# Patient Record
Sex: FEMALE | Race: BLACK OR AFRICAN AMERICAN | NOT HISPANIC OR LATINO | URBAN - METROPOLITAN AREA
[De-identification: names, ages, dates, MRNs, and addresses within clinical notes are randomized per-mention and may not be internally consistent; named-entity substitution may affect disease eponyms.]

---

## 2017-02-09 ENCOUNTER — OFFICE VISIT - HEALTHEAST (OUTPATIENT)
Dept: GERIATRICS | Facility: CLINIC | Age: 82
End: 2017-02-09

## 2017-02-09 DIAGNOSIS — I10 ESSENTIAL HYPERTENSION: ICD-10-CM

## 2017-02-09 DIAGNOSIS — M17.9 OA (OSTEOARTHRITIS) OF KNEE: ICD-10-CM

## 2017-02-09 DIAGNOSIS — Z86.73 HISTORY OF CVA (CEREBROVASCULAR ACCIDENT): ICD-10-CM

## 2017-02-09 DIAGNOSIS — F03.918 DEMENTIA WITH BEHAVIORAL PROBLEM (H): ICD-10-CM

## 2017-02-13 ENCOUNTER — RECORDS - HEALTHEAST (OUTPATIENT)
Dept: LAB | Facility: CLINIC | Age: 82
End: 2017-02-13

## 2017-02-13 LAB
CHOLEST SERPL-MCNC: 190 MG/DL
FASTING STATUS PATIENT QL REPORTED: YES
HDLC SERPL-MCNC: 93 MG/DL
LDLC SERPL CALC-MCNC: 90 MG/DL
TRIGL SERPL-MCNC: 34 MG/DL

## 2017-03-27 ENCOUNTER — OFFICE VISIT - HEALTHEAST (OUTPATIENT)
Dept: GERIATRICS | Facility: CLINIC | Age: 82
End: 2017-03-27

## 2017-03-27 DIAGNOSIS — F17.200 TOBACCO USE DISORDER: ICD-10-CM

## 2017-03-27 DIAGNOSIS — I10 HTN (HYPERTENSION): ICD-10-CM

## 2017-03-27 DIAGNOSIS — M17.9 OA (OSTEOARTHRITIS) OF KNEE: ICD-10-CM

## 2017-04-17 ENCOUNTER — OFFICE VISIT - HEALTHEAST (OUTPATIENT)
Dept: GERIATRICS | Facility: CLINIC | Age: 82
End: 2017-04-17

## 2017-04-17 DIAGNOSIS — R60.0 BILATERAL LEG EDEMA: ICD-10-CM

## 2017-04-17 DIAGNOSIS — M17.9 OA (OSTEOARTHRITIS) OF KNEE: ICD-10-CM

## 2017-04-17 DIAGNOSIS — I10 HTN (HYPERTENSION): ICD-10-CM

## 2017-05-03 ENCOUNTER — OFFICE VISIT - HEALTHEAST (OUTPATIENT)
Dept: GERIATRICS | Facility: CLINIC | Age: 82
End: 2017-05-03

## 2017-05-03 DIAGNOSIS — M19.90 OA (OSTEOARTHRITIS): ICD-10-CM

## 2017-05-03 DIAGNOSIS — R60.0 LEG EDEMA: ICD-10-CM

## 2017-05-03 DIAGNOSIS — F03.90 DEMENTIA (H): ICD-10-CM

## 2017-05-03 DIAGNOSIS — I10 HTN (HYPERTENSION): ICD-10-CM

## 2017-05-08 ENCOUNTER — OFFICE VISIT - HEALTHEAST (OUTPATIENT)
Dept: GERIATRICS | Facility: CLINIC | Age: 82
End: 2017-05-08

## 2017-05-08 DIAGNOSIS — M17.0 OSTEOARTHRITIS OF KNEES, BILATERAL: ICD-10-CM

## 2017-05-08 DIAGNOSIS — R60.0 LEG EDEMA: ICD-10-CM

## 2017-05-08 DIAGNOSIS — R53.1 WEAKNESS: ICD-10-CM

## 2017-05-11 ENCOUNTER — OFFICE VISIT - HEALTHEAST (OUTPATIENT)
Dept: GERIATRICS | Facility: CLINIC | Age: 82
End: 2017-05-11

## 2017-05-11 DIAGNOSIS — R53.1 WEAKNESS: ICD-10-CM

## 2017-05-11 DIAGNOSIS — R60.0 BILATERAL LEG EDEMA: ICD-10-CM

## 2017-05-11 DIAGNOSIS — R63.5 WEIGHT GAIN: ICD-10-CM

## 2017-05-11 DIAGNOSIS — I10 ESSENTIAL HYPERTENSION: ICD-10-CM

## 2017-05-15 ENCOUNTER — OFFICE VISIT - HEALTHEAST (OUTPATIENT)
Dept: GERIATRICS | Facility: CLINIC | Age: 82
End: 2017-05-15

## 2017-05-15 DIAGNOSIS — R60.0 BILATERAL EDEMA OF LOWER EXTREMITY: ICD-10-CM

## 2017-05-15 DIAGNOSIS — T50.2X5A DIURETIC-INDUCED HYPOKALEMIA: ICD-10-CM

## 2017-05-15 DIAGNOSIS — I10 ESSENTIAL HYPERTENSION: ICD-10-CM

## 2017-05-15 DIAGNOSIS — E87.6 DIURETIC-INDUCED HYPOKALEMIA: ICD-10-CM

## 2017-05-18 ENCOUNTER — OFFICE VISIT - HEALTHEAST (OUTPATIENT)
Dept: GERIATRICS | Facility: CLINIC | Age: 82
End: 2017-05-18

## 2017-05-18 DIAGNOSIS — R50.9 FEVER: ICD-10-CM

## 2017-05-18 DIAGNOSIS — G47.00 INSOMNIA: ICD-10-CM

## 2017-05-18 DIAGNOSIS — R60.0 LEG EDEMA: ICD-10-CM

## 2017-05-18 DIAGNOSIS — R53.1 WEAKNESS: ICD-10-CM

## 2017-06-05 ENCOUNTER — OFFICE VISIT - HEALTHEAST (OUTPATIENT)
Dept: GERIATRICS | Facility: CLINIC | Age: 82
End: 2017-06-05

## 2017-06-05 DIAGNOSIS — R60.0 BILATERAL EDEMA OF LOWER EXTREMITY: ICD-10-CM

## 2017-06-05 DIAGNOSIS — M17.9 OA (OSTEOARTHRITIS) OF KNEE: ICD-10-CM

## 2017-06-05 DIAGNOSIS — E87.6 HYPOKALEMIA: ICD-10-CM

## 2017-06-05 DIAGNOSIS — E83.42 HYPOMAGNESEMIA: ICD-10-CM

## 2017-06-14 ENCOUNTER — OFFICE VISIT - HEALTHEAST (OUTPATIENT)
Dept: GERIATRICS | Facility: CLINIC | Age: 82
End: 2017-06-14

## 2017-06-14 DIAGNOSIS — E83.42 HYPOMAGNESEMIA: ICD-10-CM

## 2017-06-14 DIAGNOSIS — E87.6 HYPOKALEMIA: ICD-10-CM

## 2017-06-14 DIAGNOSIS — R60.0 BILATERAL EDEMA OF LOWER EXTREMITY: ICD-10-CM

## 2017-06-14 DIAGNOSIS — K08.9 POOR DENTITION: ICD-10-CM

## 2017-06-14 DIAGNOSIS — F03.90 DEMENTIA (H): ICD-10-CM

## 2017-06-22 ENCOUNTER — OFFICE VISIT - HEALTHEAST (OUTPATIENT)
Dept: GERIATRICS | Facility: CLINIC | Age: 82
End: 2017-06-22

## 2017-06-22 DIAGNOSIS — F03.90 DEMENTIA (H): ICD-10-CM

## 2017-06-22 DIAGNOSIS — R04.0 ANTERIOR EPISTAXIS: ICD-10-CM

## 2017-06-26 ENCOUNTER — OFFICE VISIT - HEALTHEAST (OUTPATIENT)
Dept: GERIATRICS | Facility: CLINIC | Age: 82
End: 2017-06-26

## 2017-06-26 DIAGNOSIS — K08.9 POOR DENTITION: ICD-10-CM

## 2017-06-26 DIAGNOSIS — N39.0 URINARY TRACT INFECTION: ICD-10-CM

## 2017-06-26 DIAGNOSIS — R60.0 BILATERAL LEG EDEMA: ICD-10-CM

## 2017-06-26 DIAGNOSIS — R53.1 WEAKNESS: ICD-10-CM

## 2017-06-26 DIAGNOSIS — G40.909 SEIZURE DISORDER (H): ICD-10-CM

## 2017-06-26 DIAGNOSIS — F03.90 DEMENTIA (H): ICD-10-CM

## 2017-07-03 ENCOUNTER — OFFICE VISIT - HEALTHEAST (OUTPATIENT)
Dept: GERIATRICS | Facility: CLINIC | Age: 82
End: 2017-07-03

## 2017-07-03 DIAGNOSIS — F03.90 DEMENTIA (H): ICD-10-CM

## 2017-07-03 DIAGNOSIS — M15.9 OSTEOARTHRITIS, GENERALIZED: ICD-10-CM

## 2017-07-03 DIAGNOSIS — G40.909 SEIZURE DISORDER (H): ICD-10-CM

## 2017-07-03 DIAGNOSIS — R53.1 WEAKNESS: ICD-10-CM

## 2017-07-03 DIAGNOSIS — Z86.73 HISTORY OF CVA (CEREBROVASCULAR ACCIDENT): ICD-10-CM

## 2017-08-21 ENCOUNTER — OFFICE VISIT - HEALTHEAST (OUTPATIENT)
Dept: GERIATRICS | Facility: CLINIC | Age: 82
End: 2017-08-21

## 2017-08-21 DIAGNOSIS — F03.918 DEMENTIA WITH BEHAVIORAL PROBLEM (H): ICD-10-CM

## 2017-08-21 DIAGNOSIS — R50.9 HYPERPYREXIA: ICD-10-CM

## 2017-08-21 DIAGNOSIS — I10 ESSENTIAL HYPERTENSION: ICD-10-CM

## 2017-08-23 ENCOUNTER — OFFICE VISIT - HEALTHEAST (OUTPATIENT)
Dept: GERIATRICS | Facility: CLINIC | Age: 82
End: 2017-08-23

## 2017-08-23 DIAGNOSIS — R50.9 FEVER: ICD-10-CM

## 2017-08-23 DIAGNOSIS — F03.918 DEMENTIA WITH BEHAVIORAL PROBLEM (H): ICD-10-CM

## 2017-08-23 DIAGNOSIS — R53.83 LETHARGY: ICD-10-CM

## 2017-08-28 ENCOUNTER — OFFICE VISIT - HEALTHEAST (OUTPATIENT)
Dept: GERIATRICS | Facility: CLINIC | Age: 82
End: 2017-08-28

## 2017-08-28 DIAGNOSIS — J18.9 PNEUMONIA: ICD-10-CM

## 2017-08-28 DIAGNOSIS — K08.9 POOR DENTITION: ICD-10-CM

## 2017-08-28 DIAGNOSIS — F03.918 DEMENTIA WITH BEHAVIORAL PROBLEM (H): ICD-10-CM

## 2017-08-28 DIAGNOSIS — M15.9 OSTEOARTHRITIS, GENERALIZED: ICD-10-CM

## 2017-08-28 DIAGNOSIS — R53.83 LETHARGY: ICD-10-CM

## 2017-08-30 ENCOUNTER — AMBULATORY - HEALTHEAST (OUTPATIENT)
Dept: GERIATRICS | Facility: CLINIC | Age: 82
End: 2017-08-30

## 2017-10-18 ENCOUNTER — OFFICE VISIT - HEALTHEAST (OUTPATIENT)
Dept: GERIATRICS | Facility: CLINIC | Age: 82
End: 2017-10-18

## 2017-10-18 DIAGNOSIS — Z86.73 HISTORY OF CVA (CEREBROVASCULAR ACCIDENT): ICD-10-CM

## 2017-10-18 DIAGNOSIS — F03.90 DEMENTIA (H): ICD-10-CM

## 2017-10-18 DIAGNOSIS — E78.5 HYPERLIPIDEMIA: ICD-10-CM

## 2017-10-18 DIAGNOSIS — G40.909 SEIZURE DISORDER (H): ICD-10-CM

## 2017-10-18 DIAGNOSIS — I10 HYPERTENSION: ICD-10-CM

## 2017-10-25 ENCOUNTER — OFFICE VISIT - HEALTHEAST (OUTPATIENT)
Dept: GERIATRICS | Facility: CLINIC | Age: 82
End: 2017-10-25

## 2017-10-25 DIAGNOSIS — F03.90 DEMENTIA (H): ICD-10-CM

## 2017-10-25 DIAGNOSIS — M15.9 OSTEOARTHRITIS, GENERALIZED: ICD-10-CM

## 2017-10-25 DIAGNOSIS — G40.909 SEIZURE DISORDER (H): ICD-10-CM

## 2017-10-25 DIAGNOSIS — Z86.73 HISTORY OF CVA (CEREBROVASCULAR ACCIDENT): ICD-10-CM

## 2018-01-16 ENCOUNTER — OFFICE VISIT - HEALTHEAST (OUTPATIENT)
Dept: GERIATRICS | Facility: CLINIC | Age: 83
End: 2018-01-16

## 2018-01-16 DIAGNOSIS — F03.90 DEMENTIA (H): ICD-10-CM

## 2018-01-16 DIAGNOSIS — G40.909 SEIZURE DISORDER AS SEQUELA OF CEREBROVASCULAR ACCIDENT (H): ICD-10-CM

## 2018-01-16 DIAGNOSIS — I69.398 SEIZURE DISORDER AS SEQUELA OF CEREBROVASCULAR ACCIDENT (H): ICD-10-CM

## 2018-01-16 DIAGNOSIS — I61.9 CVA (CEREBROVASCULAR ACCIDENT DUE TO INTRACEREBRAL HEMORRHAGE) (H): ICD-10-CM

## 2018-01-17 ENCOUNTER — RECORDS - HEALTHEAST (OUTPATIENT)
Dept: LAB | Facility: CLINIC | Age: 83
End: 2018-01-17

## 2018-01-17 LAB
ALBUMIN SERPL-MCNC: 3 G/DL (ref 3.5–5)
ALP SERPL-CCNC: 65 U/L (ref 45–120)
ALT SERPL W P-5'-P-CCNC: 9 U/L (ref 0–45)
ANION GAP SERPL CALCULATED.3IONS-SCNC: 5 MMOL/L (ref 5–18)
AST SERPL W P-5'-P-CCNC: 14 U/L (ref 0–40)
BILIRUB SERPL-MCNC: 0.5 MG/DL (ref 0–1)
BUN SERPL-MCNC: 24 MG/DL (ref 8–28)
CALCIUM SERPL-MCNC: 9 MG/DL (ref 8.5–10.5)
CHLORIDE BLD-SCNC: 107 MMOL/L (ref 98–107)
CO2 SERPL-SCNC: 29 MMOL/L (ref 22–31)
CREAT SERPL-MCNC: 0.8 MG/DL (ref 0.6–1.1)
GFR SERPL CREATININE-BSD FRML MDRD: >60 ML/MIN/1.73M2
GLUCOSE BLD-MCNC: 81 MG/DL (ref 70–125)
LEVETIRACETAM (KEPPRA): 16 UG/ML (ref 6–46)
POTASSIUM BLD-SCNC: 4.3 MMOL/L (ref 3.5–5)
PROT SERPL-MCNC: 6 G/DL (ref 6–8)
SODIUM SERPL-SCNC: 141 MMOL/L (ref 136–145)

## 2018-03-22 ENCOUNTER — OFFICE VISIT - HEALTHEAST (OUTPATIENT)
Dept: GERIATRICS | Facility: CLINIC | Age: 83
End: 2018-03-22

## 2018-03-22 DIAGNOSIS — M19.90 OSTEOARTHRITIS: ICD-10-CM

## 2018-04-09 ENCOUNTER — OFFICE VISIT - HEALTHEAST (OUTPATIENT)
Dept: GERIATRICS | Facility: CLINIC | Age: 83
End: 2018-04-09

## 2018-04-09 DIAGNOSIS — R04.0 EPISTAXIS: ICD-10-CM

## 2018-04-12 ENCOUNTER — OFFICE VISIT - HEALTHEAST (OUTPATIENT)
Dept: GERIATRICS | Facility: CLINIC | Age: 83
End: 2018-04-12

## 2018-04-12 DIAGNOSIS — I69.398 SEIZURE DISORDER AS SEQUELA OF CEREBROVASCULAR ACCIDENT (H): ICD-10-CM

## 2018-04-12 DIAGNOSIS — G40.909 SEIZURE DISORDER AS SEQUELA OF CEREBROVASCULAR ACCIDENT (H): ICD-10-CM

## 2018-04-12 DIAGNOSIS — F03.90 DEMENTIA (H): ICD-10-CM

## 2018-04-12 DIAGNOSIS — I61.9 CVA (CEREBROVASCULAR ACCIDENT DUE TO INTRACEREBRAL HEMORRHAGE) (H): ICD-10-CM

## 2018-04-12 DIAGNOSIS — M19.90 OSTEOARTHRITIS: ICD-10-CM

## 2018-04-12 DIAGNOSIS — R04.0 EPISTAXIS: ICD-10-CM

## 2018-05-03 ENCOUNTER — OFFICE VISIT - HEALTHEAST (OUTPATIENT)
Dept: GERIATRICS | Facility: CLINIC | Age: 83
End: 2018-05-03

## 2018-05-03 DIAGNOSIS — F03.90 DEMENTIA (H): ICD-10-CM

## 2018-05-03 DIAGNOSIS — I61.9 CVA (CEREBROVASCULAR ACCIDENT DUE TO INTRACEREBRAL HEMORRHAGE) (H): ICD-10-CM

## 2018-05-03 DIAGNOSIS — G40.909 SEIZURE DISORDER AS SEQUELA OF CEREBROVASCULAR ACCIDENT (H): ICD-10-CM

## 2018-05-03 DIAGNOSIS — I69.398 SEIZURE DISORDER AS SEQUELA OF CEREBROVASCULAR ACCIDENT (H): ICD-10-CM

## 2018-05-03 DIAGNOSIS — I10 ESSENTIAL HYPERTENSION: ICD-10-CM

## 2018-05-11 ENCOUNTER — RECORDS - HEALTHEAST (OUTPATIENT)
Dept: LAB | Facility: CLINIC | Age: 83
End: 2018-05-11

## 2018-05-14 LAB — LEVETIRACETAM (KEPPRA): <2 UG/ML (ref 6–46)

## 2018-06-11 ENCOUNTER — RECORDS - HEALTHEAST (OUTPATIENT)
Dept: LAB | Facility: CLINIC | Age: 83
End: 2018-06-11

## 2018-06-11 LAB
ANION GAP SERPL CALCULATED.3IONS-SCNC: 8 MMOL/L (ref 5–18)
BASOPHILS # BLD AUTO: 0 THOU/UL (ref 0–0.2)
BASOPHILS NFR BLD AUTO: 0 % (ref 0–2)
BUN SERPL-MCNC: 25 MG/DL (ref 8–28)
CALCIUM SERPL-MCNC: 9.3 MG/DL (ref 8.5–10.5)
CHLORIDE BLD-SCNC: 106 MMOL/L (ref 98–107)
CO2 SERPL-SCNC: 25 MMOL/L (ref 22–31)
CREAT SERPL-MCNC: 0.84 MG/DL (ref 0.6–1.1)
EOSINOPHIL # BLD AUTO: 0 THOU/UL (ref 0–0.4)
EOSINOPHIL NFR BLD AUTO: 0 % (ref 0–6)
ERYTHROCYTE [DISTWIDTH] IN BLOOD BY AUTOMATED COUNT: 14.6 % (ref 11–14.5)
GFR SERPL CREATININE-BSD FRML MDRD: >60 ML/MIN/1.73M2
GLUCOSE BLD-MCNC: 83 MG/DL (ref 70–125)
HCT VFR BLD AUTO: 41.8 % (ref 35–47)
HGB BLD-MCNC: 13.1 G/DL (ref 12–16)
LYMPHOCYTES # BLD AUTO: 1.7 THOU/UL (ref 0.8–4.4)
LYMPHOCYTES NFR BLD AUTO: 18 % (ref 20–40)
MCH RBC QN AUTO: 26 PG (ref 27–34)
MCHC RBC AUTO-ENTMCNC: 31.3 G/DL (ref 32–36)
MCV RBC AUTO: 83 FL (ref 80–100)
MONOCYTES # BLD AUTO: 0.9 THOU/UL (ref 0–0.9)
MONOCYTES NFR BLD AUTO: 10 % (ref 2–10)
NEUTROPHILS # BLD AUTO: 6.7 THOU/UL (ref 2–7.7)
NEUTROPHILS NFR BLD AUTO: 72 % (ref 50–70)
PLATELET # BLD AUTO: 132 THOU/UL (ref 140–440)
PMV BLD AUTO: 11.2 FL (ref 8.5–12.5)
POTASSIUM BLD-SCNC: 4.6 MMOL/L (ref 3.5–5)
RBC # BLD AUTO: 5.04 MILL/UL (ref 3.8–5.4)
SODIUM SERPL-SCNC: 139 MMOL/L (ref 136–145)
WBC: 9.3 THOU/UL (ref 4–11)

## 2018-06-13 ENCOUNTER — AMBULATORY - HEALTHEAST (OUTPATIENT)
Dept: GERIATRICS | Facility: CLINIC | Age: 83
End: 2018-06-13

## 2018-11-06 ENCOUNTER — RECORDS - HEALTHEAST (OUTPATIENT)
Dept: LAB | Facility: CLINIC | Age: 83
End: 2018-11-06

## 2018-11-07 LAB
ALBUMIN SERPL-MCNC: 3.6 G/DL (ref 3.5–5)
ALP SERPL-CCNC: 72 U/L (ref 45–120)
ALT SERPL W P-5'-P-CCNC: 11 U/L (ref 0–45)
ANION GAP SERPL CALCULATED.3IONS-SCNC: 9 MMOL/L (ref 5–18)
AST SERPL W P-5'-P-CCNC: 15 U/L (ref 0–40)
BILIRUB SERPL-MCNC: 0.5 MG/DL (ref 0–1)
BUN SERPL-MCNC: 25 MG/DL (ref 8–28)
CALCIUM SERPL-MCNC: 9.6 MG/DL (ref 8.5–10.5)
CHLORIDE BLD-SCNC: 104 MMOL/L (ref 98–107)
CO2 SERPL-SCNC: 26 MMOL/L (ref 22–31)
CREAT SERPL-MCNC: 0.82 MG/DL (ref 0.6–1.1)
GFR SERPL CREATININE-BSD FRML MDRD: >60 ML/MIN/1.73M2
GLUCOSE BLD-MCNC: 83 MG/DL (ref 70–125)
LEVETIRACETAM (KEPPRA): 15.2 UG/ML (ref 6–46)
POTASSIUM BLD-SCNC: 4.2 MMOL/L (ref 3.5–5)
PROT SERPL-MCNC: 6.9 G/DL (ref 6–8)
SODIUM SERPL-SCNC: 139 MMOL/L (ref 136–145)

## 2019-01-15 ENCOUNTER — RECORDS - HEALTHEAST (OUTPATIENT)
Dept: LAB | Facility: CLINIC | Age: 84
End: 2019-01-15

## 2019-01-15 LAB — TSH SERPL DL<=0.005 MIU/L-ACNC: 0.29 UIU/ML (ref 0.3–5)

## 2019-01-17 ENCOUNTER — RECORDS - HEALTHEAST (OUTPATIENT)
Dept: LAB | Facility: CLINIC | Age: 84
End: 2019-01-17

## 2019-01-17 LAB
ANION GAP SERPL CALCULATED.3IONS-SCNC: 12 MMOL/L (ref 5–18)
BUN SERPL-MCNC: 19 MG/DL (ref 8–28)
CALCIUM SERPL-MCNC: 9.5 MG/DL (ref 8.5–10.5)
CHLORIDE BLD-SCNC: 101 MMOL/L (ref 98–107)
CO2 SERPL-SCNC: 24 MMOL/L (ref 22–31)
CREAT SERPL-MCNC: 0.79 MG/DL (ref 0.6–1.1)
ERYTHROCYTE [DISTWIDTH] IN BLOOD BY AUTOMATED COUNT: 14.9 % (ref 11–14.5)
GFR SERPL CREATININE-BSD FRML MDRD: >60 ML/MIN/1.73M2
GLUCOSE BLD-MCNC: 88 MG/DL (ref 70–125)
HCT VFR BLD AUTO: 39.8 % (ref 35–47)
HGB BLD-MCNC: 12.9 G/DL (ref 12–16)
MAGNESIUM SERPL-MCNC: 2 MG/DL (ref 1.8–2.6)
MCH RBC QN AUTO: 25.9 PG (ref 27–34)
MCHC RBC AUTO-ENTMCNC: 32.4 G/DL (ref 32–36)
MCV RBC AUTO: 80 FL (ref 80–100)
PLATELET # BLD AUTO: 124 THOU/UL (ref 140–440)
PMV BLD AUTO: 11 FL (ref 8.5–12.5)
POTASSIUM BLD-SCNC: 4 MMOL/L (ref 3.5–5)
RBC # BLD AUTO: 4.98 MILL/UL (ref 3.8–5.4)
SODIUM SERPL-SCNC: 137 MMOL/L (ref 136–145)
TSH SERPL DL<=0.005 MIU/L-ACNC: 0.88 UIU/ML (ref 0.3–5)
VIT B12 SERPL-MCNC: 684 PG/ML (ref 213–816)
WBC: 8.2 THOU/UL (ref 4–11)

## 2019-03-04 ENCOUNTER — RECORDS - HEALTHEAST (OUTPATIENT)
Dept: LAB | Facility: CLINIC | Age: 84
End: 2019-03-04

## 2019-03-04 LAB — MAGNESIUM SERPL-MCNC: 2 MG/DL (ref 1.8–2.6)

## 2019-07-01 ENCOUNTER — RECORDS - HEALTHEAST (OUTPATIENT)
Dept: LAB | Facility: CLINIC | Age: 84
End: 2019-07-01

## 2019-07-01 LAB
ANION GAP SERPL CALCULATED.3IONS-SCNC: 10 MMOL/L (ref 5–18)
BUN SERPL-MCNC: 29 MG/DL (ref 8–28)
CALCIUM SERPL-MCNC: 10.1 MG/DL (ref 8.5–10.5)
CHLORIDE BLD-SCNC: 103 MMOL/L (ref 98–107)
CO2 SERPL-SCNC: 28 MMOL/L (ref 22–31)
CREAT SERPL-MCNC: 0.79 MG/DL (ref 0.6–1.1)
GFR SERPL CREATININE-BSD FRML MDRD: >60 ML/MIN/1.73M2
GLUCOSE BLD-MCNC: 70 MG/DL (ref 70–125)
MAGNESIUM SERPL-MCNC: 2.2 MG/DL (ref 1.8–2.6)
POTASSIUM BLD-SCNC: 3.6 MMOL/L (ref 3.5–5)
SODIUM SERPL-SCNC: 141 MMOL/L (ref 136–145)

## 2019-08-06 ENCOUNTER — RECORDS - HEALTHEAST (OUTPATIENT)
Dept: LAB | Facility: CLINIC | Age: 84
End: 2019-08-06

## 2019-08-06 LAB — MAGNESIUM SERPL-MCNC: 1.9 MG/DL (ref 1.8–2.6)

## 2021-05-30 VITALS — WEIGHT: 134 LBS | BODY MASS INDEX: 25.15 KG/M2

## 2021-05-30 VITALS — BODY MASS INDEX: 24.48 KG/M2 | WEIGHT: 130.4 LBS

## 2021-05-30 VITALS — BODY MASS INDEX: 24.52 KG/M2 | WEIGHT: 130.6 LBS

## 2021-05-30 VITALS — BODY MASS INDEX: 23.46 KG/M2 | WEIGHT: 125 LBS

## 2021-05-30 VITALS — BODY MASS INDEX: 24.42 KG/M2 | WEIGHT: 130.1 LBS

## 2021-05-30 VITALS — WEIGHT: 138.6 LBS | BODY MASS INDEX: 26.02 KG/M2

## 2021-05-30 VITALS — WEIGHT: 128.6 LBS | BODY MASS INDEX: 24.14 KG/M2

## 2021-05-30 VITALS — BODY MASS INDEX: 22.19 KG/M2 | WEIGHT: 118.2 LBS

## 2021-05-31 VITALS — BODY MASS INDEX: 24.25 KG/M2 | WEIGHT: 129.2 LBS

## 2021-05-31 VITALS — BODY MASS INDEX: 23.3 KG/M2 | WEIGHT: 124.1 LBS

## 2021-05-31 VITALS — WEIGHT: 129 LBS | BODY MASS INDEX: 24.22 KG/M2

## 2021-05-31 VITALS — WEIGHT: 128.2 LBS | BODY MASS INDEX: 24.07 KG/M2

## 2021-05-31 VITALS — BODY MASS INDEX: 22.76 KG/M2 | WEIGHT: 121.25 LBS

## 2021-05-31 VITALS — WEIGHT: 127.2 LBS | BODY MASS INDEX: 23.88 KG/M2

## 2021-05-31 VITALS — WEIGHT: 132 LBS | BODY MASS INDEX: 24.78 KG/M2

## 2021-05-31 VITALS — WEIGHT: 131.1 LBS | BODY MASS INDEX: 24.61 KG/M2

## 2021-05-31 VITALS — BODY MASS INDEX: 24.07 KG/M2 | WEIGHT: 128.2 LBS

## 2021-06-01 VITALS — BODY MASS INDEX: 25.79 KG/M2 | WEIGHT: 137.4 LBS

## 2021-06-01 VITALS — WEIGHT: 136 LBS | BODY MASS INDEX: 25.53 KG/M2

## 2021-06-01 VITALS — BODY MASS INDEX: 25.15 KG/M2 | WEIGHT: 134 LBS

## 2021-06-08 NOTE — PROGRESS NOTES
[facility]    [facility]   [place] [facility][id number]   Code Status: DNR/DNI      CHIEF COMPLAINT/REASON FOR VISIT:  Chief Complaint   Patient presents with     Review Of Multiple Medical Conditions       HISTORY:      HPI: Halima is a 81 y.o. female who was seen  Today to  review her chronic multiple medical conditions. Staff reported that patient has been stable  And there has not been any new changes since the last visit. He has not taken  Shower since she had been here at facility. Today she has let the staff nurse to clean her room and change her clothes. Occasionally she  lets staff give  her washcloth to clean her face and arms. She also occasionally let staff do magnus care. Patient was found walking into another resident's room and saying he is stealing her belongings. Patient does have a history of delusion and tend to  hide things in her room and she also refuses to leave the room saying that someone will steal her belongings.    Today during the visit patient was found in her usual chair very pleasant and eating her lunch. Patient asked me to join with her and eat lunch together. She was in a very good mood and very appropriate behavior. She reported she has been all doing well with no complaints of chest pain palpitation, shortness of breath, lightheaded, nausea, and vomiting. He reported that occasionally he has trouble with the patient and she also mentioned about her arthritis although she denies any pain during the visit. Staff reported that patient's toenails are getting very long and has missed  appointment with Podiatry twice in the past. With last podiatry, there was a noted from staff nurse that patient has actually refused to go down for the appointment. Podiatry is due to visit next month, and we have discussed with  Staff that we should definitely make an attempt to bring her down for the appointment. I further discussed this with patient and explain to her how  important it is for her to not miss the  appointment and make sure she gets her toenails clipped so she doesn't get injury and infection from the long toe nails. I have discussed this with our nurse manager here and it seems that patient gets very anxious and scared that she refuses to get her nail clipped.    Patient had no other new concerns at this time and answered all her questions and addressed. Her SBP was in 170's yesterday and recheck today was in 150's. She denies having any stroke like symptoms, weakness, or visual disturbances. Patient was brought down for hair wash and for the first time she let staff bring her down and help her wash her hair although she was upset a bit per staff.    Past Medical History:   Diagnosis Date     Acute respiratory failure      Ascending cholangitis      AV block, 3rd degree      Choledocholithiasis     S/P ERCP     Dementia      Hemorrhagic stroke 2010    WITH SUBSEQUENT SEIZURE     HTN (hypertension)      Hyperlipidemia      Hypertensive retinopathy      Seizure disorder      Sickle cell trait      Small bowel obstruction      Tobacco use              Family History   Problem Relation Age of Onset     Cataracts Mother      Social History     Social History     Marital status:      Spouse name: N/A     Number of children: N/A     Years of education: N/A     Social History Main Topics     Smoking status: Never Smoker     Smokeless tobacco: Current User      Comment: snuff, chew     Alcohol use No     Drug use: Not on file     Sexual activity: Not on file     Other Topics Concern     Not on file     Social History Narrative     No narrative on file         Review of Systems   Negative unless noted in HPI    .  Vitals:    02/09/17 1527   BP: 175/77   Pulse: 95   Resp: 18   Temp: 98  F (36.7  C)   SpO2: 98%   Weight: 118 lb 3.2 oz (53.6 kg)       Physical Exam   Constitutional: No distress.   HENT:   Head: Normocephalic and atraumatic.   Right Ear: External ear normal.    Left Ear: External ear normal.   Mouth/Throat: Oropharynx is clear and moist.   Eyes: Conjunctivae are normal. Right eye exhibits no discharge. Left eye exhibits no discharge. No scleral icterus.   Neck: No JVD present. No tracheal deviation present. No thyromegaly present.   Cardiovascular:   Murmur heard.  Pulmonary/Chest: Effort normal and breath sounds normal.   Abdominal: Soft. Bowel sounds are normal. She exhibits no distension. There is no tenderness.   Genitourinary:   Genitourinary Comments: Denies dysuria or CVA tenderness   Musculoskeletal: She exhibits no edema or tenderness.   Up walks around in room. Moves all her extremities   Lymphadenopathy:     She has no cervical adenopathy.   Neurological: She is alert.   Oriented to place and situation   Skin: Skin is warm and dry. She is not diaphoretic.   Psychiatric: She has a normal mood and affect. Her behavior is normal.          LABS:     Lab Results   Component Value Date    PHENYTOIN 4.7 (L) 02/01/2016     Lab Results   Component Value Date    FERRITIN 13 04/22/2015     Lab Results   Component Value Date    FERRITIN 13 04/22/2015     VITAMIN D, TOTAL (25-HYDROXY)   Date Value Ref Range Status   07/24/2014 49.5 30.0 - 80.0 ng/mL Final       Lab Results   Component Value Date    ALT 21 11/16/2016    AST 20 11/16/2016    ALKPHOS 127 (H) 11/16/2016    BILITOT 0.4 11/16/2016         Results for orders placed or performed in visit on 09/02/16   Basic Metabolic Panel   Result Value Ref Range    Sodium 139 136 - 145 mmol/L    Potassium 4.6 3.5 - 5.0 mmol/L    Chloride 102 98 - 107 mmol/L    CO2 30 22 - 31 mmol/L    Anion Gap, Calculation 7 5 - 18 mmol/L    Glucose 76 70 - 125 mg/dL    Calcium 9.5 8.5 - 10.5 mg/dL    BUN 18 8 - 28 mg/dL    Creatinine 0.70 0.60 - 1.10 mg/dL    GFR MDRD Af Amer >60 >60 mL/min/1.73m2    GFR MDRD Non Af Amer >60 >60 mL/min/1.73m2         Lab Results   Component Value Date    WBC 9.4 11/16/2016    HGB 13.3 11/16/2016    HCT 40.8  11/16/2016    MCV 84 11/16/2016     (L) 11/16/2016         ASSESSMENT:      ICD-10-CM    1. Essential hypertension I10    2. Dementia with behavioral problem F03.91    3. OA (osteoarthritis) of knee M17.9    4. History of CVA (cerebrovascular accident) Z86.73        PLAN:    1. Currently not on any HTN medication. If her BP continues to rise, will consider starting her on antihypertensive med.  2. Stable and appropriate behavior.  3. Check  dilantin, serum iron, Hgb, and levetiracetam level and fasting lipid on Monday and update result.  4. Give 0.5 mg 1/2 tab of Ativan PO X 1 before appointment with Podiatry in March. Repeat another 1/2 tab PRN.       Electronically signed by: Dwayne Galan NP

## 2021-06-09 NOTE — PROGRESS NOTES
Code Status:  DNR/DNI  Visit Type: Problem Visit (OA of knee now, growth on roof of mouth, lymph node enlargement)     Facility:  [facility] [[id number]]        Facility Type:  (Long Term Care, LTC)    History of Present Illness: Halima Limon is a 81 y.o. female who I saw today due to her ongoing pain in her bilateral knees secondary to osteoarthritis.  Patient is currently on Tylenol 500 mg daily and tramadol 50 mg every morning.  Patient is also on prednisone 10 mg daily.  Staff reported that patient has been complaining of pain in her knees and tramadol is not adequate to keep her pain in control for whole day.  Staff also reported that patient does not like taking pills and requested if I can review her medication and cut down her medications.    During the visit, patient was found sitting in her usual chair pleasant and cooperative with assessment.  Patient was pointing to her both knees and said they are hurting and she just received her morning dose of tramadol.  Patient also complained about pain under right submandibular area.  On assessment patient was found to have lymph node enlargement in bilateral submandibular area with tenderness on the right side.  She does not have any open sore or wound or ulcer in her mouth however noted a firm growth on bilateral upper palate which was nontender to touch.  Patient has a history of chronic tobacco use and currently using it.  Patient has been noncompliant with her hygiene care and most probably has not been doing self oral care or let staff help her with the oral care.  I have discussed with the staff that I would recommend to check with patient's niece who is the POA and see if we can convince her to schedule an appointment with the dentist for further assessment and evaluation for her growth in her upper palate and also right sub-mandibular pain.    Review of Systems   Negative unless noted in HPI    Physical Exam   Constitutional: No distress.   Patient room smells with urine.  She has been noncompliant with the hygiene care and has never taken shower.  HENT:   Head: Normocephalic and atraumatic.   Right Ear: External ear normal.   Left Ear: External ear normal.   Mouth/Throat: Oropharynx is clear and moist. Noted bilateral upper palate- has symmetry growth but firm and nontender. No ulcer or open areas noted in oral mucosa..   Eyes: Conjunctivae are normal. Right eye exhibits no discharge. Left eye exhibits no discharge. No scleral icterus.   Neck: No JVD present. No tracheal deviation present. No thyromegaly present.  Bilateral submandibular lymph node enlargement with tenderness on the right side.  Staff denies recent symptoms of cold, flu, fever, shortness of breath, nausea, vomiting.  Cardiovascular:   Murmur heard.  Pulmonary/Chest: Effort normal and breath sounds normal.   Abdominal: Soft. Bowel sounds are normal. She exhibits no distension. There is no tenderness.   Genitourinary:   Genitourinary Comments: Denies dysuria or CVA tenderness   Musculoskeletal: She exhibits no edema or tenderness.   Up walks around in room. Moves all her extremities . Bilateral knees tender to touch . H/o OA  Lymphadenopathy:   She has no cervical adenopathy.   Neurological: She is alert.   Oriented to place and situation   Skin: Skin is warm and dry. She is not diaphoretic.   Psychiatric: She has a normal mood and affect. Her behavior is normal.      Labs:    Results for orders placed or performed in visit on 09/02/16   Basic Metabolic Panel   Result Value Ref Range    Sodium 139 136 - 145 mmol/L    Potassium 4.6 3.5 - 5.0 mmol/L    Chloride 102 98 - 107 mmol/L    CO2 30 22 - 31 mmol/L    Anion Gap, Calculation 7 5 - 18 mmol/L    Glucose 76 70 - 125 mg/dL    Calcium 9.5 8.5 - 10.5 mg/dL    BUN 18 8 - 28 mg/dL    Creatinine 0.70 0.60 - 1.10 mg/dL    GFR MDRD Af Amer >60 >60 mL/min/1.73m2    GFR MDRD Non Af Amer >60 >60 mL/min/1.73m2           Assessment:  1. OA  (osteoarthritis) of knee     2. Tobacco use disorder     3. HTN (hypertension)         Plan:   1.  DC previous tramadol.  Start tramadol 25 mg 3 times daily scheduled and 25 mg at nightly as needed hold tramadol for oversedation and update.   2.  Patient is chronic tobacco user and has bilateral submandibular lymph node enlargement with tenderness on the right side.  I will request staff to contact patient's POA to schedule an appointment with the dentist for further evaluation and assessment.  3.  Patient is currently not on any antihypertensive.  This morning her systolic blood pressure was in 150s but her blood pressure came down after she received her tramadol this morning and now her systolic blood pressures in the 130s.  Therefore we will monitor her blood pressure twice a week and see if it improves with increased tramadol dosage and will consider adding an antihypertensive medicine if she continues to run high blood pressure.        Electronically signed by: Dwayne Galan NP

## 2021-06-10 NOTE — PROGRESS NOTES
Code Status:  DNR/DNI  Visit Type: Problem Visit (Weight gain, Leg edema, Weakness)     Facility:  [facility] [[id number]]        Facility Type:  (Long Term Care, LTC)    History of Present Illness: Halima Limon is a 81 y.o. female who I was asked to see today as patient continues to gain weight, worsening of her leg edema, hypertension and weakness.  Patient usually weighs around 124-25 pounds.  Her weight went up to 131.6 pounds.  She was briefly treated with the furosemide 10 mg with no result.  She was then started on furosemide 20 mg and increase to 30 mg when her weight went up to 134 pounds.  Today her weight is 138.6 pounds and her legs are still swollen.  Staff also reported patient seemed weak and complaining of having pain in her both lower legs from the swelling.  Patient was recently sent to the [facility] ED for further evaluation when they first found her with the leg edema with some skin discoloration on the left lower leg.  Her ultrasound showed negative for any DVT at that time.  She was not started on a diuretic at the time due to her risk for falls.    Today during the visit, patient was found in her room eating her meals.  She was pleasant and cooperative with me as usual.  She did reported that her legs are sore from the swelling.  Patient does have a history of osteoarthritis in both knees.  She was on prednisone 10 mg which was reduced to 5 mg and I discontinued it to prevent further sodium retention, hypertension and swelling.  Patient also is on as needed naproxen.  I talked to the staff today and they stated that they have been using the as needed naproxen whenever patient is having discomfort in her knees from the arthritis.  Patient has a compression stocking on.  She denies having any chest pain, heart palpitation, shortness of breath, lightheaded or dizziness.    Review of Systems   Negative unless noted in HPI    Physical Exam  HENT: Head: Normocephalic and atraumatic.  Right Ear: External ear normal. Left Ear: External ear normal.   Mouth/Throat: Oropharynx is clear and moist.   Eyes: Right eye exhibits no discharge. Left eye exhibits no discharge. No scleral icterus.   Neck: No JVD present. No tracheal deviation present. No thyromegaly present.   Cardiovascular: Normal rate and regular rhythm. Exam reveals no gallop and no friction rub.   No murmur heard.  Pulmonary/Chest: Effort normal and breath sounds normal. No respiratory distress. She has no wheezes. She has no rales. She exhibits no tenderness.   Abdominal: Soft. Bowel sounds are normal. She exhibits no distension. There is no tenderness.   Genitourinary:   Genitourinary Comments: Incontinent at times. Inconsistent with wearing brief.   Musculoskeletal: She exhibits edema. Knee pain from OA and lower leg sore from swelling. Compression stocking on. 2+ leg pitting edema in bilateral LE. Staff noted patient is more weak now.  Lymphadenopathy:   She has no cervical adenopathy.   Neurological: She is alert. Oriented to self    Skin: Skin is warm and dry. She is not diaphoretic.   Psychiatric: She has a normal mood and affect. Her behavior is normal.      Labs:   Patient's sodium level 143, potassium 3.8, BUN 20, creatinine 0.71 on May 8.    Results for orders placed or performed during the hospital encounter of 04/14/17   Basic Metabolic Panel   Result Value Ref Range    Sodium 140 136 - 145 mmol/L    Potassium 4.5 3.5 - 5.0 mmol/L    Chloride 104 98 - 107 mmol/L    CO2 28 22 - 31 mmol/L    Anion Gap, Calculation 8 5 - 18 mmol/L    Glucose 103 70 - 125 mg/dL    Calcium 9.5 8.5 - 10.5 mg/dL    BUN 20 8 - 28 mg/dL    Creatinine 0.80 0.60 - 1.10 mg/dL    GFR MDRD Af Amer >60 >60 mL/min/1.73m2    GFR MDRD Non Af Amer >60 >60 mL/min/1.73m2           Assessment:  1. Bilateral leg edema     2. Weight gain     3. Essential hypertension     4. Weakness         Plan:   1.  Start Zaroxolyn 2.5 mg twice daily today and 2.5 mg daily  starting tomorrow.    2.  Check weight every other day and monitor legs for swelling.  3.  Check BMP on Monday.  Although her potassium level was 3.8 on May 8, we will continue the potassium chloride 10 mEq supplement to prevent hypokalemia.  4.  Patient's blood pressure today is 137/71 with heart rate of 80s.  5.  Her LFT and magnesium level was drawn today and the result is pending. D/c Naproxen PRN          Electronically signed by: Dwayne Galan NP

## 2021-06-10 NOTE — PROGRESS NOTES
Code Status:  DNR/DNI  Visit Type: Problem Visit (leg edema, OA, HTN)     Facility:  [facility] [[id number]]        Facility Type:  (Long Term Care, LTC)    History of Present Illness: Halima Limon is a 81 y.o. female who I saw today for her recent increased swelling in her legs and discoloration, also following up on her chronic osteoarthritis and hypertension.  Staff noted swelling in her both legs last Friday with some discoloration on her left lower leg.  Therefore patient was sent into ED for further evaluation to check for DVT.  The ED note indicates that patient'S bilateral ultrasound of lower legs were negative for DVT and all her lab work were unremarkable with no signs of infection.  Therefore, she was sent back here to long-term care with recommendation to apply SANDRO stockings for her swelling.    Patient has a history of osteoarthritis in her both knees and she is currently on tramadol 25 mg 3 times daily and as needed naproxen.  She is also on prednisone 10 mg p.o. daily for maintenance.  Patient is also on omeprazole 20 mg twice daily I believe it is for her GI protection.  However she is on a very low dose of prednisone and has been tolerating well therefore I am going to decrease her omeprazole dose to 20 mg p.o. daily.  Staff reported no new concerns with increased pain in her knee.  During the assessment, there is no increased swelling in her knees noted.  She also has a history of hypertension and currently not on antihypertensive medicines.  Her blood pressure today was 159/78.  Her systolic blood pressure runs mostly in 140s and occasionally in 120s and 130s and high systolic 150s-170s.  Patient is allergic to lisinopril and because of her swelling and legs the calcium channel blocker may not be a good choice for her blood pressure control.  She has pitting edema in both legs 2-3+ worse on the left lower leg.  We are going to order the SANDRO stockings for her leg edema.  In addition, I  am going to put her on a very low dose of furosemide to help leg edema and also help with the blood pressure.    Review of Systems   Negative unless noted in HPI    Physical Exam  Constitutional: No distress.  Patient room smells with urine. She has been noncompliant with the hygiene care and has never taken shower.  HENT:   Head: Normocephalic and atraumatic.   Right Ear: External ear normal.   Left Ear: External ear normal.   Mouth/Throat: Oropharynx is clear and moist. Noted bilateral upper palate- has symmetry growth but firm and nontender-unchanged from previous assessment. No ulcer or open areas noted in oral mucosa. Has Dental appointment today but got cancelled due to family not able to bring her in.  Eyes: Conjunctivae are normal. Right eye exhibits no discharge. Left eye exhibits no discharge. No scleral icterus.   Neck: No JVD present. No tracheal deviation present. No thyromegaly present.  Bilateral submandibular lymph node enlargement with no tenderness today.  Staff denies recent symptoms of cold, flu, fever, shortness of breath, nausea, vomiting.  Cardiovascular:   Murmur heard. Pedal pulse palpable - but weak. Warm to touch  Pulmonary/Chest: Effort normal and breath sounds normal.   Abdominal: Soft. Bowel sounds are normal. She exhibits no distension. There is no tenderness.   Genitourinary:   Genitourinary Comments: Denies dysuria or CVA tenderness   Musculoskeletal: She exhibits no edema or tenderness.   Up walks around in room. Moves all her extremities . Bilateral knees tender to touch . H/o OA. Leg pitting edema 2-3 +  Left >right.  Lymphadenopathy:   She has no cervical adenopathy.   Neurological: She is alert.   Oriented to place and situation   Skin: Skin is warm and dry. She is not diaphoretic. Noted a discoloration spot on left mid lower leg (medial part) no redness or inflammation on surrounding tissue, non tender- appears as cold scar.   Psychiatric: She has a normal mood and affect. Her  behavior is normal.     Labs:   Recent Results (from the past 240 hour(s))   Basic Metabolic Panel    Collection Time: 04/14/17  6:09 PM   Result Value Ref Range    Sodium 140 136 - 145 mmol/L    Potassium 4.5 3.5 - 5.0 mmol/L    Chloride 104 98 - 107 mmol/L    CO2 28 22 - 31 mmol/L    Anion Gap, Calculation 8 5 - 18 mmol/L    Glucose 103 70 - 125 mg/dL    Calcium 9.5 8.5 - 10.5 mg/dL    BUN 20 8 - 28 mg/dL    Creatinine 0.80 0.60 - 1.10 mg/dL    GFR MDRD Af Amer >60 >60 mL/min/1.73m2    GFR MDRD Non Af Amer >60 >60 mL/min/1.73m2   BNP(B-type Natriuretic Peptide)    Collection Time: 04/14/17  6:09 PM   Result Value Ref Range    BNP 24 0 - 159 pg/mL       Assessment:  1. Bilateral leg edema      Left worse than right   2. HTN (hypertension)     3. OA (osteoarthritis) of knee         Plan:   1.  Apply compression stockings in a.m. and remove in p.m.  Weekly weight per facility protocol and update if weight greater than 3 pounds from the previous reading.  2.  Start furosemide 10 mg p.o. daily ×7 days.  Check BMP in 1 week.   3.  Check blood pressure and heart rate daily and update in a week.   4.  Hold furosemide if systolic blood pressure less than 105.  Update if worsening of leg edema.  5.  Decrease omeprazole to 20 mg p.o. daily.      35  minutes spent of which greater than 65 % was face to face communication with the patient, coordination with staff, and reviewing patient's past medical record about above plan of care    Electronically signed by: Dwayne Galan NP

## 2021-06-10 NOTE — PROGRESS NOTES
Code Status:  DNR/DNI  Visit Type: Problem Visit (leg edema, HTN, Hypokalemia)     Facility:  [facility] [[id number]]        Facility Type:  (Long Term Care, LTC)    History of Present Illness: Halima Limon is a 81 y.o. female who I was asked to see today due to her leg edema.  Staff reported that patient legs are weeping but does not have any open sore.  Patient also has a history of hypertension and today found to have hypokalemia due to diuretic.    Patient's weight was 135 pounds on May 12 and today her weight is 130.1 pounds.  Patient usually weighs around 124-125 pounds.  During my visit with the patient she was found sitting in her wheelchair.  Since she has been having issue with the increased leg edema, she has not been able to walk.  Staff also reported that patient is complaining of having pain and discomfort in her both legs.  At one point her weight went up to 138 pounds.  She was on furosemide 20 mg which was increased to 30 mg with no result.  Since she was started on metolazone 2.5 on May 12, she has lost at least 8 pounds.  Staff has been working hard to get her blood pressure checked every day.  However, most of the time she refuses.  Her blood pressure fluctuates from systolic 160s down to 118.  Heart rate has been stable.    Patient denies any pain in her legs during my visit.  She appears comfortable but noted her somewhat low energy compared to past.  She further denies having any chest pain, shortness of breath, lightheaded or dizziness.  Her weight loss reassures me that the metolazone has somewhat helped in fluid retention in her legs.  Her legs are still swollen and was weeping per staff which is probably due to the metolazone.  Patient is currently on potassium supplement 10 mEq p.o. daily.  Her potassium today is 3.4.  Her magnesium was 1.7.    Review of Systems   Negative unless noted in HPI     Physical Exam   Constitutional: No distress.   Appears tired   HENT:   Head:  Normocephalic and atraumatic.   Right Ear: External ear normal.   Left Ear: External ear normal.   Mouth/Throat: Oropharynx is clear and moist.   Eyes: Conjunctivae are normal. Right eye exhibits no discharge. Left eye exhibits no discharge. No scleral icterus.   Neck: No JVD present. No tracheal deviation present. No thyromegaly present.   Cardiovascular: Normal rate and regular rhythm.    Pulmonary/Chest: Effort normal and breath sounds normal. No respiratory distress. She has no wheezes.   Abdominal: Soft. Bowel sounds are normal. She exhibits no distension. There is no tenderness.   Genitourinary:   Genitourinary Comments: Denies dysuria   Musculoskeletal:   Edema 2+ in both legs- slight improvement compared to my previous assessment. Lost 8 lbs since being on metolazone. Compression stocking on.  Denies knee pain. Walking less since leg swelling started. More wheelchair bound   Lymphadenopathy:     She has no cervical adenopathy.   Neurological: She is alert.   Oriented to self   Skin: Skin is warm and dry. She is not diaphoretic.   Psychiatric: She has a normal mood and affect. Her behavior is normal.   Pleasant and cooperative.     Labs:    Recent Results (from the past 240 hour(s))   Basic Metabolic Panel    Collection Time: 05/08/17 12:22 PM   Result Value Ref Range    Sodium 143 136 - 145 mmol/L    Potassium 3.8 3.5 - 5.0 mmol/L    Chloride 104 98 - 107 mmol/L    CO2 27 22 - 31 mmol/L    Anion Gap, Calculation 12 5 - 18 mmol/L    Glucose 105 70 - 125 mg/dL    Calcium 9.3 8.5 - 10.5 mg/dL    BUN 20 8 - 28 mg/dL    Creatinine 0.71 0.60 - 1.10 mg/dL    GFR MDRD Af Amer >60 >60 mL/min/1.73m2    GFR MDRD Non Af Amer >60 >60 mL/min/1.73m2   Magnesium    Collection Time: 05/12/17  2:10 PM   Result Value Ref Range    Magnesium 1.7 (L) 1.8 - 2.6 mg/dL   Potassium    Collection Time: 05/12/17  2:10 PM   Result Value Ref Range    Potassium 4.0 3.5 - 5.0 mmol/L   Hepatic Profile    Collection Time: 05/12/17  2:10 PM    Result Value Ref Range    Bilirubin, Total 0.2 0.0 - 1.0 mg/dL    Bilirubin, Direct 0.1 <=0.5 mg/dL    Protein, Total 7.3 6.0 - 8.0 g/dL    Albumin 3.3 (L) 3.5 - 5.0 g/dL    Alkaline Phosphatase 126 (H) 45 - 120 U/L    AST 22 0 - 40 U/L    ALT 22 0 - 45 U/L   Basic Metabolic Panel    Collection Time: 05/15/17  7:34 AM   Result Value Ref Range    Sodium 139 136 - 145 mmol/L    Potassium 3.4 (L) 3.5 - 5.0 mmol/L    Chloride 101 98 - 107 mmol/L    CO2 27 22 - 31 mmol/L    Anion Gap, Calculation 11 5 - 18 mmol/L    Glucose 97 70 - 125 mg/dL    Calcium 9.3 8.5 - 10.5 mg/dL    BUN 24 8 - 28 mg/dL    Creatinine 0.76 0.60 - 1.10 mg/dL    GFR MDRD Af Amer >60 >60 mL/min/1.73m2    GFR MDRD Non Af Amer >60 >60 mL/min/1.73m2       Assessment:  1. Bilateral edema of lower extremity     2. Diuretic-induced hypokalemia     3. Essential hypertension         Plan:  1. Continue Metolazone 2.5 mg PO daily.  Once patient's weight is close to her baseline, I will consider weaning her off of metolazone.  2.  Increase potassium chloride to 20 mEq p.o. daily.  Check potassium and magnesium level on Thursday.  3.  Today blood pressure is stable.  We will continue to monitor her blood pressure and will consider adding a blood pressure medicine once we wean her off of metolazone if her blood pressure continues to run high.  4.  Reduce Seroquel from 75 mg to 50 mg at bedtime since patient seemed more tired during the daytime.  Patient is also getting scheduled tramadol 3 times daily for her knee pain.        Electronically signed by: Dwayne Galan NP

## 2021-06-10 NOTE — PROGRESS NOTES
Code Status:  DNR/DNI  Visit Type: Problem Visit (Insomnia, Weakness, Leg Edema, Fever)     Facility:  [facility] [[id number]]        Facility Type:  (Long Term Care, LTC)    History of Present Illness: Halima Limon is a 81 y.o. female who I was asked to see today as patient did not sleep whole night last night and stayed up in the chair.  She was also found to be very weak today.  Patient has been having an ongoing issue with her leg edema and was started on furosemide which did not help with the swelling and therefore was switched to metolazone 2.5 mg p.o. daily.  Her last BMP report on May 15 was unremarkable except potassium 3.4 and therefore was started on the potassium supplement.  And her magnesium level at the time was 1.7.  Patient was also noted to have a low-grade fever of 99.4 which is new.    Staff has been reporting that patient has been more sleepy and weak during the daytime.  She was on Seroquel 75 mg p.o. at bedtime which I reduced the dose to 50 mg at bedtime on May 16.  We have plan to check her magnesium and potassium level today.  However, the lab was not successful in drawing blood this morning.  During the visit, patient was found laying in her bed appearing week.  Since last October, I have never seen patient laying in bed during the day time.  She is usually mostly up sitting in her chair.  She also has been mostly wheelchair-bound due to the weakness, swelling in her legs and pain in her legs from the arthritis.      We also started a compression stocking to help with the swelling however patient refused to wear the stockings most of the time.  There is somewhat improvement in her leg swelling and her weight dropped from 138 pounds down to 130.4 pounds since was started on metolazone.  Patient's baseline weight is around 125-128 pounds.  She has been denying having any chest pain, heart palpitation, shortness of breath, lightheaded or dizziness.      Her chest x-ray from the  "hospital on April 14 showed mild cardiomegaly with no murmur pulmonary vessels, mild tortuosity of thoracic aorta, and both hemidiaphragms are elevated with clear lungs.  Her ultrasound of bilateral lower extremity showed negative for DVT.  Her LFT on May 12 is unremarkable except albumin 3.3, alk phos weight 126.  Her BNP on April 14 was 24.     As I was talking with the staff nurse and the nurse manager, we have noted that overall there is a decline in patient's condition.  She is mostly noncompliant with the treatment plan.  She refuses to get cleaned or change her clothes and rarely let staff help her with the hygiene care.    Review of Systems   Negative unless noted in HPI    Physical Exam   Constitutional: No distress. Appears weak and said \"I am not feeling good\". More quite today.  HENT:   Head: Normocephalic and atraumatic.   Right Ear: External ear normal.   Left Ear: External ear normal.   Mouth/Throat: Oropharynx is clear and moist.   Eyes: Conjunctivae are normal. Right eye exhibits no discharge. Left eye exhibits no discharge. No scleral icterus.   Neck: No JVD present. No tracheal deviation present. No thyromegaly present.   Cardiovascular: Normal rate and regular rhythm.    Pulmonary/Chest: Effort normal and breath sounds normal. No respiratory distress. She has no wheezes.   Abdominal: Soft. Bowel sounds are normal. She exhibits no distension. There is no tenderness.   Genitourinary:   Genitourinary Comments: Denies dysuria - incontinent  Musculoskeletal: Edema 2+ in both legs- slight improvement compared to my previous assessment. Lost 8 lbs since being on metolazone. Compression stocking on. More wheelchair bound since the leg swelling got worse. Found in bed today and staff reported patient was up all night.  Lymphadenopathy:     She has no cervical adenopathy.   Neurological: She is alert. Oriented to self   Skin: Skin is warm and dry. She is not diaphoretic.   Psychiatric: She has a normal " mood and affect. Her behavior is normal. More quite today and stated not feeling good.     Labs:    Recent Results (from the past 240 hour(s))   Magnesium    Collection Time: 05/12/17  2:10 PM   Result Value Ref Range    Magnesium 1.7 (L) 1.8 - 2.6 mg/dL   Potassium    Collection Time: 05/12/17  2:10 PM   Result Value Ref Range    Potassium 4.0 3.5 - 5.0 mmol/L   Hepatic Profile    Collection Time: 05/12/17  2:10 PM   Result Value Ref Range    Bilirubin, Total 0.2 0.0 - 1.0 mg/dL    Bilirubin, Direct 0.1 <=0.5 mg/dL    Protein, Total 7.3 6.0 - 8.0 g/dL    Albumin 3.3 (L) 3.5 - 5.0 g/dL    Alkaline Phosphatase 126 (H) 45 - 120 U/L    AST 22 0 - 40 U/L    ALT 22 0 - 45 U/L   Basic Metabolic Panel    Collection Time: 05/15/17  7:34 AM   Result Value Ref Range    Sodium 139 136 - 145 mmol/L    Potassium 3.4 (L) 3.5 - 5.0 mmol/L    Chloride 101 98 - 107 mmol/L    CO2 27 22 - 31 mmol/L    Anion Gap, Calculation 11 5 - 18 mmol/L    Glucose 97 70 - 125 mg/dL    Calcium 9.3 8.5 - 10.5 mg/dL    BUN 24 8 - 28 mg/dL    Creatinine 0.76 0.60 - 1.10 mg/dL    GFR MDRD Af Amer >60 >60 mL/min/1.73m2    GFR MDRD Non Af Amer >60 >60 mL/min/1.73m2       Assessment:  1. Fever     2. Weakness     3. Insomnia     4. Leg edema         Plan:  1. Stat BMP, CBC with differential, Mg +, UA and UC if indicated.  Give Ativan 0.25 p.o. ×1/2 an hour before straight cath for obtaining urine sample.  2. Change Seroquel from 50 mg to 75 mg p.o. at at bedtime.  3. DC metolazone.  Monitor weight and leg swelling.  Update if weight gain greater than 3 pounds from the previous weight with increased swelling leg.  4. We will correct the electrolytes and infection source based on above result. Will consider palliative care consult, if patient continues to decline. This was discussed with nurse manager, and staff nurse.          Electronically signed by: Dwayne Galan NP

## 2021-06-10 NOTE — PROGRESS NOTES
Code Status:  DNR/DNI  Visit Type: Problem Visit (lower leg edema, HTN, Dementia, refuse treatment)     Facility:  [facility] [[id number]]        Facility Type:  (Long Term Care, LTC)    History of Present Illness: Halima Limon is a 81 y.o. female who I was asked to see today by staff regarding concern with patient ongoing having issue with leg edema.  Patient also has history of hypertension and not on an antihypertensive medication.  she also has a dementia and osteoarthritis in her knees.  I saw patient on April 17 for her leg edema and started her on furosemide 10 mg p.o. daily ×1 week.  I also requested staff to monitor her blood pressure and heart rate for 1 week and check her BMP in 1 week.  I remember reviewing her BMP level which was unremarkable.  However I did not get update on whether the Lasix was effective for her leg edema and blood pressure readings.    Today during the visit, I saw patient sitting in her usual chair appearing comfortable and in no distress.  Patient stated that she is doing okay.  Her both legs appear more swollen than when I saw her last time on April 17.  Patient does not have a compression stocking on.  When I checked with the staff nurse he stated that patient has been very inconsistent with wearing the compression stocking.  She sometimes allow staff to apply it on and other times she refuses.  Same issue with her weight check it appears that she has been refusing weight check most of the time although today her weight is 130.6 pounds her weight on April 14 was 128.6 pounds which indicates that she has gained at least 2 pounds.  Prior to my previous visit, she was sent to the [facility] ED when her leg edema as it was first noted with some discoloration on her left leg.  She underwent ultrasound for both legs which were negative for DVT.  However, they thought it was unsafe to put her on diuretics because of her cognitive impairment and increased risk for  fall.    Patient has been on prednisone 10 mg p.o. daily for at least more than 3 months.  As 1 of the side effect of prednisone it could possibly increase her blood pressure and fluid retention and sodium retention.  Patient is also on tramadol for her arthritis pain.  Staff reported no issues or concerns with pain control.  Therefore I discussed with the staff that I am going to decrease her prednisone to half a dose and probably discontinue and see if that helps to resolve her edema and improve her blood pressure.  I further discussed with the staff that I am going to restart her on her furosemide on a higher dose but it is very important that we monitor her weight blood pressure very closely and monitor her for dehydration and risk for fall.  Per nurse manager, patient was seen by the wound wound specialist Dr. Mayes yesterday.  Patient denies having any chest pain, heart palpitation, shortness of breath, lightheaded or dizziness.  She further denies having nausea, vomiting, diarrhea or constipation.    Review of Systems   Negative unless noted in HPI.  Staff reported no new onset of shortness of breath, chest pain, heart palpitation, shortness of breath, lightheaded or dizziness.  Physical Exam   Constitutional: No distress.   Room smells urine. Patient rarely let staff help her with bathing and changing clothes   HENT:   Head: Normocephalic and atraumatic.   Right Ear: External ear normal.   Left Ear: External ear normal.   Mouth/Throat: Oropharynx is clear and moist.   Eyes: Right eye exhibits no discharge. Left eye exhibits no discharge. No scleral icterus.   Neck: No JVD present. No tracheal deviation present. No thyromegaly present.   Cardiovascular: Normal rate and regular rhythm.  Exam reveals no gallop and no friction rub.    No murmur heard.  Pulmonary/Chest: Effort normal and breath sounds normal. No respiratory distress. She has no wheezes. She has no rales. She exhibits no tenderness.    Abdominal: Soft. Bowel sounds are normal. She exhibits no distension. There is no tenderness.   Genitourinary:   Genitourinary Comments: Incontinent at times. Inconsistent with wearing brief. Helps herself to the bathroom.   Musculoskeletal: She exhibits edema.   2+ leg edema in bilateral LE. Noted blister X 1 on each leg. Up independent.    Lymphadenopathy:     She has no cervical adenopathy.   Neurological: She is alert.   Oriented to self    Skin: Skin is warm and dry. She is not diaphoretic.   Psychiatric: She has a normal mood and affect. Her behavior is normal.       Labs:    Results for orders placed or performed during the hospital encounter of 04/14/17   Basic Metabolic Panel   Result Value Ref Range    Sodium 140 136 - 145 mmol/L    Potassium 4.5 3.5 - 5.0 mmol/L    Chloride 104 98 - 107 mmol/L    CO2 28 22 - 31 mmol/L    Anion Gap, Calculation 8 5 - 18 mmol/L    Glucose 103 70 - 125 mg/dL    Calcium 9.5 8.5 - 10.5 mg/dL    BUN 20 8 - 28 mg/dL    Creatinine 0.80 0.60 - 1.10 mg/dL    GFR MDRD Af Amer >60 >60 mL/min/1.73m2    GFR MDRD Non Af Amer >60 >60 mL/min/1.73m2         Assessment:  1. Leg edema     2. HTN (hypertension)     3. Dementia     4. OA (osteoarthritis)      Of knees       Plan:   1.  Start furosemide 20 mg p.o. daily.  Check potassium level on Monday.  Encourage patient to wear compression stocking every day.  Hold Lasix if systolic blood pressure less than 105.  2.  Monitor blood pressure and heart rate daily and update result on Monday.  Monitor weight twice weekly and update result on Monday.  3.  Safety precautions for fall as patient is on diuretics now.  This was discussed with the staff nurse.  4.  Decrease prednisone to 5 mg p.o. daily ×1 week and discontinue.          Electronically signed by: Dwayne Galan NP

## 2021-06-10 NOTE — PROGRESS NOTES
Code Status:  DNR/DNI  Visit Type: Problem Visit (Leg edema, HTN, weakness, knee pain)     Facility:  [facility] [[id number]]        Facility Type:  (Long Term Care, LTC)    History of Present Illness: Halima Limon is a 81 y.o. female who I was asked to see today to follow-up on her leg edema, hypertension, weakness, and knee pain.  Patient was recently started on furosemide 20 mg daily on May 4.  Her systolic blood pressure has been in 130s-150s with one blood pressure reading up in 160s.  Patient was noted to have increased weakness and  increased swelling in her legs.  I ordered a stat BMP level today.  Her BMP level was unremarkable.  Staff was requested to check patient's weight twice a week.  However, patient has been refusing to get her weight checked.  Her weight on May 2 was 130.6 pounds and today her weight is 134 pounds.  This indicates that patient has gained at least 4 pounds.  As per the medical flowsheet, patient has been taking her furosemide.  I also reduced her prednisone from 10 mg to 5 mg daily on May 4 for 1 week and then discontinue it with hope that it will help to improve her swelling and her blood pressure.  Her weakness could be related to increased edema in her legs and also related to the dose reduction on prednisone since patient has been on the prednisone 10 mg for a long time.    Today during the visit, patient was found in her usual chair and she was complaining of pain in her bilateral knee from the osteoarthritis.  This morning I requested staff to hold her tramadol since patient was found to be weak.  During my visit, she was comfortable but she was not drowsy or sleepy.  Therefore I requested staff to resume her as needed tramadol and monitor for oversedation.    I discussed with the staff that I will increase her furosemide dose to 30 mg daily to improve her leg swelling and also help with the hypertension.  I also will add a small dose of potassium supplement to  prevent hypokalemia related to the diuretics.     Review of Systems   Negative unless noted in HPI    Physical Exam  Constitutional: No distress. Room smells urine. Patient rarely let staff help her with bathing and changing clothes   HENT:   Head: Normocephalic and atraumatic.   Right Ear: External ear normal.   Left Ear: External ear normal.   Mouth/Throat: Oropharynx is clear and moist.   Eyes: Right eye exhibits no discharge. Left eye exhibits no discharge. No scleral icterus.   Neck: No JVD present. No tracheal deviation present. No thyromegaly present.   Cardiovascular: Normal rate and regular rhythm. Exam reveals no gallop and no friction rub.   No murmur heard.  Pulmonary/Chest: Effort normal and breath sounds normal. No respiratory distress. She has no wheezes. She has no rales. She exhibits no tenderness.   Abdominal: Soft. Bowel sounds are normal. She exhibits no distension. There is no tenderness.   Genitourinary:   Genitourinary Comments: Incontinent at times. Inconsistent with wearing brief. Helps herself to the bathroom.   Musculoskeletal: She exhibits edema. C/o pain in her both knees since her Tramadol was held this morning.  2+ leg pitting edema in bilateral LE. Up independent.    Lymphadenopathy:   She has no cervical adenopathy.   Neurological: She is alert. Oriented to self    Skin: Skin is warm and dry. She is not diaphoretic.   Psychiatric: She has a normal mood and affect. Her behavior is normal.     Labs:    May 8: Sodium 143, potassium 3.8, BUN 20, creatinine 0.71.    Results for orders placed or performed during the hospital encounter of 04/14/17   Basic Metabolic Panel   Result Value Ref Range    Sodium 140 136 - 145 mmol/L    Potassium 4.5 3.5 - 5.0 mmol/L    Chloride 104 98 - 107 mmol/L    CO2 28 22 - 31 mmol/L    Anion Gap, Calculation 8 5 - 18 mmol/L    Glucose 103 70 - 125 mg/dL    Calcium 9.5 8.5 - 10.5 mg/dL    BUN 20 8 - 28 mg/dL    Creatinine 0.80 0.60 - 1.10 mg/dL    GFR MDRD Af  Amer >60 >60 mL/min/1.73m2    GFR MDRD Non Af Amer >60 >60 mL/min/1.73m2         Assessment:  1. Leg edema     2. Weakness     3. Osteoarthritis of knees, bilateral         Plan:   1.  Increase Lasix to 30 mg p.o. daily starting tomorrow.  Add potassium chloride 10 mEq p.o. daily starting tomorrow.  Encourage patient to wear compression stocking. Elevate leg above heart level in bed and chair.  2. Check BMP, magnesium and LFT on Thursday and update result.  3.  Resume previous as needed tramadol and update if no improvement.      Electronically signed by: Dwayne Galan NP

## 2021-06-11 NOTE — PROGRESS NOTES
Code Status:  DNR/DNI  Visit Type: Problem Visit (hypomagnesemia, Hypokalemia, Leg edema, Knee pain)     Facility:  [facility] [[id number]]        Facility Type:  (Long Term Care, LTC)    History of Present Illness: Halima Limon is a 82 y.o. female who I am seeing today to follow-up on her recent low magnesium level of 1.5 and potassium level of 3.0.  Patient also has edema in her both legs and has a history of knee pain related to osteoarthritis.  Patient's magnesium level was 1.5 on May 18 and potassium level was 3.0.  We treated it with the potassium supplement and magnesium supplement.  Her recheck magnesium level was 1.5 and potassium was 3.5 on May 22.  Currently patient is on magnesium oxide 400 mg 3 times daily and she is no longer on potassium supplement.  Patient was on metolazone 2.5 mg p.o. daily for her leg edema.  However, patient became very weak and therefore we discontinued the metolazone.  Patient has a history of osteoarthritis in both knees and she is currently on tramadol 25 mg schedule 3 times daily.  She is also on scheduled Tylenol once a day.  Staff reported that sometimes patient refuses to take her medications.  For the last 4 days, patient has been taking her magnesium supplement except she missed 1 dose.  She also refuses to get her blood pressure and weight checked on a regular basis as we order.    Today during the visit, patient was found sitting in her room appearing comfortable.  She reports that her knee hurts sometimes but patient was not having pain during the visit.  Patient used to be on prednisone low-dose 10 mg p.o. daily.  However, we discontinued it when we noted her increased swelling in both legs and high blood pressure.  Today her blood pressure is 138/74, heart rate 74 and oxygen 100% on room air.  Staff reported patient has been comfortable.  She does not have issue with appetite or sleep except patient continues to have edema in her both legs.    Review of  Systems   Negative unless noted in HPI    Physical Exam  Constitutional: No distress. Appears comfortable sitting in her chair.  HENT:   Head: Normocephalic and atraumatic.   Right Ear: External ear normal.   Left Ear: External ear normal.   Mouth/Throat: Oropharynx is clear and moist.   Eyes: Conjunctivae are normal. Right eye exhibits no discharge. Left eye exhibits no discharge. No scleral icterus.   Neck: No JVD present. No tracheal deviation present. No thyromegaly present.   Cardiovascular: Normal rate and regular rhythm.    Pulmonary/Chest: Effort normal and breath sounds normal. No respiratory distress. She has no wheezes.   Abdominal: Soft. Bowel sounds are normal. She exhibits no distension. There is no tenderness.   Genitourinary:   Genitourinary Comments: Denies dysuria - incontinent  Musculoskeletal: Edema 2+ in both legs- mild pitting. Patient doesn't have her compression stocking on. Patient has been up walking with cane.  Lymphadenopathy: She has no cervical adenopathy.   Neurological: She is alert. Oriented to self   Skin: Skin is warm and dry. She is not diaphoretic.   Psychiatric: She has a normal mood and affect. Her behavior is normal.      Labs:    Results for orders placed or performed in visit on 05/18/17   Basic Metabolic Panel   Result Value Ref Range    Sodium 139 136 - 145 mmol/L    Potassium 3.0 (L) 3.5 - 5.0 mmol/L    Chloride 98 98 - 107 mmol/L    CO2 33 (H) 22 - 31 mmol/L    Anion Gap, Calculation 8 5 - 18 mmol/L    Glucose 120 70 - 125 mg/dL    Calcium 9.1 8.5 - 10.5 mg/dL    BUN 18 8 - 28 mg/dL    Creatinine 0.74 0.60 - 1.10 mg/dL    GFR MDRD Af Amer >60 >60 mL/min/1.73m2    GFR MDRD Non Af Amer >60 >60 mL/min/1.73m2       Lab Results   Component Value Date    K 3.5 05/22/2017     Mg level 1.5 on 5/18 and 1.5 on 5/22  Assessment:  1. Hypomagnesemia     2. Hypokalemia     3. Bilateral edema of lower extremity     4. OA (osteoarthritis) of knee         Plan:   1.  Increase magnesium  oxide from 400 mg 3 times daily to 4 times daily.  Her low magnesium could be related to her noncompliant with taking her medication, recent diuretic use, and on chronic PPI for GI protection.  Her past medical history does not indicate patient has history of GERD.  She might have been started on omeprazole when she was on naproxen and prednisone.  We will consider discontinuing her omeprazole and monitor if her magnesium continues to remain low.  2.  Recheck potassium level and magnesium level on Thursday and update result.  3.  Patient usually weighs around 128-130s.  Her weight today is 134 pounds.  We will continue to monitor her leg edema and will consider treating her with the diuresis only if patient is symptomatic.  I highly encouraged staff to put compression stocking for patient to control her leg edema.      25  minutes spent of which greater than 65% was face to face communication with the patient and coordination of care with staff about above plan of care    Electronically signed by: Dwayne Galan NP

## 2021-06-11 NOTE — PROGRESS NOTES
Code Status:  FULL CODE  Visit Type: Problem Visit (weak,acp)     Facility:  [facility] [[id number]]        Facility Type:  (Long Term Care, LTC)    History of Present Illness: Halmia Limon is a 82 y.o. female who on Friday, June 23 was found to be weak fall risk lethargic and poorly communicative.  We did lab work at that juncture and later by evening time determined that she had a urinary tract infection and was then subsequently started on Ceftin 250  mg twice daily.  Since that time she became more lucid and had no further difficulties and return to her previous baseline.  She does carry a history of dementia respiratory failure in the past along with bilateral pedal edema thought to be due to congestive heart failure.  She has had hyperlipidemia and recently was taken off of Lipitor.  We felt in addition to going over with family what it transpired a request to discuss CODE STATUS.    Review of Systems     Physical Exam   HENT:   poor dentition is noted prior   Cardiovascular: Regular rhythm.    Pulmonary/Chest: Effort normal and breath sounds normal.   Musculoskeletal: She exhibits edema.   Osteoarthritis of the knees.   Psychiatric:   Patient alert interactive at her baseline status greets me very friendly with knowing who I am knowing who she is in knowing situation.   Vitals reviewed.      Labs:  All labs reviewed in the nursing home record.  Recent Results (from the past 240 hour(s))   Phenytoin (Dilantin )    Collection Time: 06/23/17 11:06 AM   Result Value Ref Range    Phenytoin 1.2 (L) 10.0 - 20.0 ug/mL   HM1 (CBC with Diff)    Collection Time: 06/23/17 11:06 AM   Result Value Ref Range    WBC 10.3 4.0 - 11.0 thou/uL    RBC 4.83 3.80 - 5.40 mill/uL    Hemoglobin 13.3 12.0 - 16.0 g/dL    Hematocrit 39.5 35.0 - 47.0 %    MCV 82 80 - 100 fL    MCH 27.5 27.0 - 34.0 pg    MCHC 33.7 32.0 - 36.0 g/dL    RDW 12.9 11.0 - 14.5 %    Platelets 129 (L) 140 - 440 thou/uL    MPV 12.3 8.5 - 12.5 fL     Neutrophils % 69 50 - 70 %    Lymphocytes % 19 (L) 20 - 40 %    Monocytes % 11 (H) 2 - 10 %    Eosinophils % 1 0 - 6 %    Basophils % 0 0 - 2 %    Neutrophils Absolute 7.0 2.0 - 7.7 thou/uL    Lymphocytes Absolute 2.0 0.8 - 4.4 thou/uL    Monocytes Absolute 1.1 (H) 0.0 - 0.9 thou/uL    Eosinophils Absolute 0.1 0.0 - 0.4 thou/uL    Basophils Absolute 0.0 0.0 - 0.2 thou/uL   Comprehensive Metabolic Panel    Collection Time: 06/23/17  4:50 PM   Result Value Ref Range    Sodium 138 136 - 145 mmol/L    Potassium 4.2 3.5 - 5.0 mmol/L    Chloride 103 98 - 107 mmol/L    CO2 26 22 - 31 mmol/L    Anion Gap, Calculation 9 5 - 18 mmol/L    Glucose 86 70 - 125 mg/dL    BUN 21 8 - 28 mg/dL    Creatinine 0.71 0.60 - 1.10 mg/dL    GFR MDRD Af Amer >60 >60 mL/min/1.73m2    GFR MDRD Non Af Amer >60 >60 mL/min/1.73m2    Bilirubin, Total 0.3 0.0 - 1.0 mg/dL    Calcium 9.7 8.5 - 10.5 mg/dL    Protein, Total 7.7 6.0 - 8.0 g/dL    Albumin 3.4 (L) 3.5 - 5.0 g/dL    Alkaline Phosphatase 128 (H) 45 - 120 U/L    AST 21 0 - 40 U/L    ALT 11 0 - 45 U/L   Urinalysis-UC if Indicated    Collection Time: 06/23/17  5:36 PM   Result Value Ref Range    Color, UA Yellow Colorless, Yellow, Straw, Light Yellow    Clarity, UA Cloudy (!) Clear    Glucose, UA Negative Negative    Bilirubin, UA Negative Negative    Ketones, UA Negative Negative    Specific Gravity, UA 1.010 1.001 - 1.030    Blood, UA Negative Negative    pH, UA 6.5 4.5 - 8.0    Protein, UA Negative Negative mg/dL    Urobilinogen, UA <2.0 E.U./dL <2.0 E.U./dL, 2.0 E.U./dL    Nitrite, UA Negative Negative    Leukocytes, UA Large (!) Negative    Bacteria, UA Moderate (!) None Seen hpf    RBC, UA 0-2 None Seen, 0-2 hpf    WBC, UA  (!) None Seen, 0-5 hpf    Squam Epithel, UA 0-5 None Seen, 0-5 lpf    WBC Clumps Present (!) None Seen    Trans Epithel, UA 0-5 (!) None Seen lpf    Mucus, UA Few (!) None Seen lpf   Culture, Urine    Collection Time: 06/23/17  5:36 PM   Result Value Ref Range     Culture No Growth          Assessment:  1. Weakness     2. Urinary tract infection     3. Bilateral leg edema     4. Seizure disorder     5. Poor dentition     6. Dementia         Plan: As the patient had very positive response to the Ceftin I am going to complete the course of it despite the fact that the urine culture to date has not grown anything.  Additionally we inform the family of the low Dilantin level but with lack of seizures do not feel he need to push the dose particularly in lieu of her often refusing it anyway.  We had a extended conversation with granddaughter and son about advanced directives.  We discussed that in lieu of her dementia and underlying respiratory and cardiac concerns that acute resuscitation would carry great risk with little benefit.  Granddaughter had a very good grasp of this and explained this to Halima his son in great detail.  They are going to come up with final decision on Thursday.  In addition to this we did discuss the load Dilantin level and her current management of her case.      35 minutes spent of which greater than 65% was face to face communication with the patient about above plan of care    Electronically signed by: Deangelo Argueta MD

## 2021-06-11 NOTE — PROGRESS NOTES
Code Status:  FULL CODE  Visit Type: Problem Visit (bleeding,shoaib,acp)     Facility:  [facility] [[id number]]        Facility Type:  (Long Term Care, LTC)    History of Present Illness: Halima Limon is a 82 y.o. female who I was asked to see acutely because of her being found with a significant amount of blood and she was leaning forward.  Blood on the floor and blood on her chair where she likes to sit.  It has dementia and cannot always tell us what is going on.  She denied any pain tonight any agitation.  When we clarified and discovered the source of the bleeding which was her nose I am she did say oh yes I do have bleeding nose.    Review of Systems     Physical Exam   Constitutional:   We placed the patient in bed and look for source of bleeding there was no skin breakdown noted.  Her heart was regular rate and rhythm nonrapid abdomen was soft nontender.  Careful view of the nares did show some blood on both nostrils but it appeared that it was more significant on the right side.  I was not able to see any active bleeding and there was no significant inflammation noted.   Neurological: She is alert.   He was very cooperative and nonagitated this morning.   Vitals reviewed.      Labs:  All labs reviewed in the nursing home record.    Assessment:  1. Anterior epistaxis     2. Dementia         Plan: We will recommend Aquaphor twice daily ×3 days to the nares try to heal this area.  Would not be more aggressive as I am fairly certain the patient would not allow this.  As her dementia is often preventing us from doing intervention such as being able to track lab work and her dementia is worse we will request for the power of  which is her granddaughter to have a discussion with this about changing CODE STATUS to a less aggressive stance.  In the interim I am going to hold the aspirin as this could be the source of the blood loss.  We did recommend to the staff that if they see her nose picking  to try to avoid doing that that this may indeed be part of the problem.  Also be the patient's chewing tobacco that is part of the problem or use.snoose.      25 minutes spent of which greater than 65% was face to face communication with the patient about above plan of care    Electronically signed by: Deangelo Argueta MD

## 2021-06-11 NOTE — PROGRESS NOTES
Code Status:  FULL CODE  Visit Type: Problem Visit (weak,immobile)     Facility:  [facility] [[id number]]        Facility Type:  (Long Term Care, LTC)    History of Present Illness: Halima Limon is a 82 y.o. female last week had an episode of weakness and workup revealed suggestions of a urinary tract infection.  She was treated with Ceftin and has completed that course.  However the urine culture did not grow any bacteria.  Yesterday she became acutely weak so much so that she required 2 people to get her to stand and she would not stand on her own which is new.  She was not confused.  She was not combative but clearly weak.  She just wanted to lay in bed and was complaining of pain.  Pain medicine and Tylenol did not alleviate her difficulties.  Remotely we have had her on prednisone and weaned her off of that.  This was for generalized osteoarthritis.  Staff reported that when they touched her legs are moved her she would describe pain.  There is no history of a fall she denies it although she has dementia but she was not discovered in in a fall posture or in a fall from her bed.    Review of Systems     Physical Exam   Constitutional:   Patient was laying in bed and would engage me in talk but really did not want to move.  This is unlike her usually she would rise to greet me.  Pupils were equal round reactive to light there is no injection.  She was complaining of and I could elicit pain in her left neck area but she felt that that was equal to some pains in her legs.  On body wide palpation there was pain at times in her left ankle than her right ankle and it time in her knees.  Trying to move these did elicit pain.  Moving her hips did not elicit pain nor did pelvic push or tilt.  Palpating spinous processes did not engender pain.  Her lungs were clear to auscultation abdomen soft bowel sounds were positive.  Was regular rate and nonrapid   Vitals reviewed.      Labs:  All labs reviewed in the  nursing home record.    Assessment:  1. Weakness     2. History of CVA (cerebrovascular accident)     3. Dementia     4. Seizure disorder     5. Osteoarthritis, generalized         Plan: We had had an extensive meeting with family and have not changed CODE STATUS as of yet but they were strongly considering this option.  In regards to her current situation this appears not to be a fracture or fall and does not appear to be due to electrolyte imbalances we recently checked this.  Chances are greatest that this is a flare of her osteoarthritis and she had in the past been on prednisone for this.  Because of this we are going to institute prednisone 40 mg today 30 mg for 2 days 20 mg for 2 days and then continue her on 10 mg.  We will check back in several days to see if we get a good response and get her mobile again.  In addition we had not checked a Keppra level so I am going to do a Keppra level with CBC with differential repeat and a sed rate.  We will get those done today      25 minutes spent of which greater than 65% was face to face communication with the patient about above plan of care    Electronically signed by: Deangelo Argueta MD

## 2021-06-11 NOTE — PROGRESS NOTES
[facility]    Facility:   [facility]  [[id number]]   Code Status: FULL CODE      CHIEF COMPLAINT/REASON FOR VISIT:  Chief Complaint   Patient presents with     Review Of Multiple Medical Conditions       HISTORY:      HPI: Halima is a 82 y.o. female who I am seeing today for review of chronic medical conditions.  She gone to the dentist who felt that she had multiple necrotic teeth and that she would be well served by having dental extraction.  Unfortunately secondary to her dementia and at times combative behavior they were unable to do a fully adequate exam.  He did recommend a possible sedation for the extraction procedure.  Additionally she is refused to wear SANDRO hose for her pedal edema and it has been progressively more difficult and to get her to take medicines that we have ordered.  She has her own power of  but with her declining mental status it is a difficult.  Additionally we were trying to get lab work to follow-up on her interventions with magnesium and potassium and she is to refuse blood draws.    Past Medical History:   Diagnosis Date     Acute respiratory failure      Ascending cholangitis      AV block, 3rd degree      Choledocholithiasis     S/P ERCP     Dementia      Hemorrhagic stroke 2010    WITH SUBSEQUENT SEIZURE     HTN (hypertension)      Hyperlipidemia      Hypertensive retinopathy      Seizure disorder      Sickle cell trait      Small bowel obstruction      Tobacco use              Family History   Problem Relation Age of Onset     Cataracts Mother      Social History     Social History     Marital status:      Spouse name: N/A     Number of children: N/A     Years of education: N/A     Social History Main Topics     Smoking status: Never Smoker     Smokeless tobacco: Current User      Comment: snuff, chew     Alcohol use No     Drug use: Not on file     Sexual activity: Not on file     Other Topics Concern     Not on file     Social  History Narrative         Review of Systems    .  Vitals:    06/14/17 1034   BP: 146/66   Pulse: 77   Weight: 129 lb 3.2 oz (58.6 kg)       Physical Exam   Constitutional:   Halima is a charming in her own way she has a very strong [place] dialect and I do not understand all of the words that she says.  But I do understand appears to be cogent clear and direct she refused a oral exam today but allowed me to check her edema she has 2+ pitting edema to the knees bilaterally as well as a heart that was irregular and nonrapid.  Lungs at this point were clear   Vitals reviewed.        LABS:   Results for orders placed or performed in visit on 05/18/17   Basic Metabolic Panel   Result Value Ref Range    Sodium 139 136 - 145 mmol/L    Potassium 3.0 (L) 3.5 - 5.0 mmol/L    Chloride 98 98 - 107 mmol/L    CO2 33 (H) 22 - 31 mmol/L    Anion Gap, Calculation 8 5 - 18 mmol/L    Glucose 120 70 - 125 mg/dL    Calcium 9.1 8.5 - 10.5 mg/dL    BUN 18 8 - 28 mg/dL    Creatinine 0.74 0.60 - 1.10 mg/dL    GFR MDRD Af Amer >60 >60 mL/min/1.73m2    GFR MDRD Non Af Amer >60 >60 mL/min/1.73m2               ASSESSMENT:      ICD-10-CM    1. Bilateral edema of lower extremity R60.0    2. Dementia F03.90    3. Hypomagnesemia E83.42    4. Hypokalemia E87.6    5. Poor dentition K08.9        PLAN:    MODESTO reports that she has family members in the area and 1 of her daughters does call her every day.  The only visit about once a month.  He will be important for her to establish a power of  as she cannot really be a decision maker for herself at this time.  We will have someone do a [place] cognitive assessment is for recording of her status and the dementia realm.  But in addition to this we will try to engage family in having them take on the power of  so we can make decisions about the dentist blood draws etc.  Also ultimately a CODE STATUS as well.  I have asked social work to contact them and try to get these things in order  and if not perhaps for us to have a meeting to discuss them.  In the interim as she is loath to take medicines we will discontinue her Lipitor and I am not going to ask for the lab to come out again as she is refused them 3 times.    Total 25 minutes of which 65% was spent counseling and coordination of care of the above plan.    Electronically signed by: Deangelo Argueta MD

## 2021-06-12 NOTE — PROGRESS NOTES
Code Status:  FULL CODE  Visit Type: Problem Visit (Fever, lethargy)     Facility:  [facility] [[id number]]        Facility Type:  (Long Term Care, LTC)    History of Present Illness: Halima Limon is a 82 y.o. female who for the past 3 days has had fever worsening lethargy is been combative some sweats initially that had passed.  She had been unwilling to allow us to do any lab work or investigation.  She had not had any seizures but she was declining and refusing to eat and was becoming progressively weaker.  Previously met with the family and after their deliberations they decided to continue an aggressive full CODE STATUS.  Patient is demented usually pleasantly so but at stress time she has been very combative.    Review of Systems     Physical Exam   Constitutional:   Patient will smile at me but will not let me get close.  She is very malodorous particularly from her mouth.  History of chew.  Later I examined and do not hear any focality in her lungs although there is decreased effort in movement.  Heart rate is slightly rapid.  She does not let me listen long enough to determine regularity.   Vitals reviewed.      Labs:  All labs reviewed in the nursing home record.    Assessment:  1. Fever     2. Lethargy     3. Dementia with behavioral problem         Plan: Patient continues to decline and persists family's request of progressive status despite her advanced dementia and my inability to be able to get lab work to evaluate underlying conditions ordered for her to examine us we like to send her to the hospital and we made those arrangements.  I did have them rechecked family for further discussions after when she returns.      35 minutes spent of which greater than 55% was face to face communication with the patient about above plan of care    Electronically signed by: Deangelo Argueta MD

## 2021-06-12 NOTE — PROGRESS NOTES
Code Status:  FULL CODE  Visit Type: Problem Visit (Fever, agitation, recent fall, uncooperative)     Facility:  [facility] [[id number]]        Facility Type:  (Long Term Care, LTC)    History of Present Illness: Halima Limon is a 82 y.o. female who yesterday had a fever of 103.5 with sweating in twitching to the left side of her body.  She was given Tylenol and her blood pressure was 121/59 with a heart rate of 108 sats remained 93% her blood sugar was 110.  At that juncture we had recommended a urine analysis which came back normal and her temp came down to 100.6.  Today we ordered lab work and she became very agitated confused screaming and of course through plates and cups on the floor and refused the blood draw.  In visiting with her I can engage her but she is clearly confused talking about her mother and several people that are no longer alive    Review of Systems     Physical Exam   Constitutional:   Difficult to exam because of agitation she is still hot to the touch her lungs with poor effort to not reveal any extra sounds.  Heart is slightly rapid.   Vitals reviewed.      Labs:  All labs reviewed in the nursing home record.    Assessment:  1. Dementia with behavioral problem     2. Essential hypertension     3. Hyperpyrexia         Plan: Family will be contacted they have been very keen on a full CODE STATUS for her.  At this juncture I asked her and she stated she would be willing although with her dementia not sure if her recall will be there to retry a blood test in the morning which I would include a CBC, magnesium, Keppra level, Dilantin level.  She is on these medicines for seizing and has not had any active seizures that were aware of.  Unfortunately she still not allowing us to give medicines.  She has as needed Haldol and I have added an IM if it is needed.  Will follow very closely      35 minutes spent of which greater than 65% was face to face communication with the patient about  above plan of care    Electronically signed by: Deangelo Argueta MD

## 2021-06-12 NOTE — PROGRESS NOTES
Code Status:  FULL CODE  Visit Type: H & P (Pneumonia)     Facility:  [facility] [[id number]]      Facility Type: NF (Long Term Care, LTC)    History of Present Illness:   Hospital Admission Date: August 23, 2017 [facility] hospital Discharge Date: August 25, 2017  Facility Admission Date: August 25, 2017 [facility]    Halima Limon is a 82 y.o. female with advanced dementia with behaviors who over a period of 3 days prior to hospital admission had a fever and some escalating behavior with being combative and not allowing us to do full evaluation.  We managed to get her some Tylenol and she had a temp max of 103.5 and then it came down.  Unfortunately by the second or third day with her continued refusals for evaluation including both laboratory and radiographic and her continued withdrawal tempered with some significant malodor we contacted family who had wanted a continued aggressive cares and elected to send her to the hospital.    Hospital course; patient was evaluated and found to have a streaky infiltrate in the posterior chest likely retrocardiac at the left base.  They were able to get lab work which showed a white count of 11.0 sodium 138 creatinine of 0.79 and negative troponins.  They felt that this was a H And for this reason they placed her on Zosyn and vancomycin.  Her IV ended up infiltrating and they converted her to oral Augmentin and she continued to improve.  They did also recommend a palliative care.    Past Medical History:   Diagnosis Date     Acute respiratory failure      Ascending cholangitis      AV block, 3rd degree      Choledocholithiasis     S/P ERCP     Dementia      Hemorrhagic stroke 2010    WITH SUBSEQUENT SEIZURE     HTN (hypertension)      Hyperlipidemia      Hypertensive retinopathy      Seizure disorder      Sickle cell trait      Small bowel obstruction      Tobacco use      Past Surgical History:   Procedure Laterality Date     ERCP       FOR ASCENDING CHOLANGITIS     OTHER SURGICAL HISTORY      laparotomy for small bowel obstruction     Family History   Problem Relation Age of Onset     Cataracts Mother      Social History     Social History     Marital status:      Spouse name: N/A     Number of children: N/A     Years of education: N/A     Occupational History     Not on file.     Social History Main Topics     Smoking status: Never Smoker     Smokeless tobacco: Current User      Comment: snuff, chew     Alcohol use No     Drug use: Not on file     Sexual activity: Not on file     Other Topics Concern     Not on file     Social History Narrative     Additional Geriatric Review patient is resident of our facility is fairly advanced in her dementia.  Does use of chewing tobacco significantly.  She is incontinent and at times combative when she becomes ill.  Family has continued to want aggressive cares despite several meetings and are suggestive of less aggressive care.  She is dependent in all her evaluations.  At times she is able to transfer and walk.  Current Outpatient Prescriptions   Medication Sig Dispense Refill     acetaminophen (TYLENOL) 500 MG tablet Take 1,000 mg by mouth 2 (two) times a day.       aspirin 81 MG EC tablet Take 81 mg by mouth daily.       docusate sodium (COLACE) 100 MG capsule Take 100 mg by mouth 2 (two) times a day.       ferrous sulfate 65 mg elemental iron Take 1 tablet by mouth daily with breakfast.       levETIRAcetam (KEPPRA) 750 MG tablet Take 750 mg by mouth 2 (two) times a day.       multivitamin (TAB A MATTHEW) per tablet Take 1 tablet by mouth daily.       omeprazole (PRILOSEC) 20 MG capsule Take 20 mg by mouth daily. 1hr prior to meal       oxyCODONE (ROXICODONE) 5 MG immediate release tablet Take 2.5 mg by mouth every 4 (four) hours as needed for pain.       phenytoin (DILANTIN) 100 MG ER capsule Take 100 mg by mouth every morning.       phenytoin (DILANTIN) 100 MG ER capsule Take 200 mg by mouth  bedtime.       senna (SENOKOT) 8.6 mg tablet Take 2 tablets by mouth daily as needed for constipation.       simvastatin (ZOCOR) 40 MG tablet Take 40 mg by mouth every evening.       traMADol (ULTRAM) 50 mg tablet Take 25 mg by mouth daily. WITH FOOD       No current facility-administered medications for this visit.      Allergies   Allergen Reactions     Lisinopril Cough       There is no immunization history on file for this patient.      Review of Systems   Unable to perform ROS: Dementia        Physical Exam   Constitutional: She is oriented to person, place, and time. She appears well-developed and well-nourished.   HENT:   Head: Normocephalic and atraumatic.   Right Ear: External ear normal.   Left Ear: External ear normal.   Nose: Nose normal.   Mouth/Throat: Oropharynx is clear and moist.   Eyes: Conjunctivae and EOM are normal. Pupils are equal, round, and reactive to light. Left eye exhibits no discharge. No scleral icterus.   Neck: Neck supple. No JVD present. No tracheal deviation present. No thyromegaly present.   Cardiovascular: Normal rate, regular rhythm and normal heart sounds.  Exam reveals no friction rub.    No murmur heard.  Pulmonary/Chest: Effort normal and breath sounds normal. No respiratory distress. She has no wheezes. She has no rales. She exhibits no tenderness.   Respiratory exam was actually normal but was not significantly different than at the time of sending the patient to the hospital several days prior   Abdominal: She exhibits no distension and no mass. There is no tenderness. There is no guarding.   Musculoskeletal: Normal range of motion. She exhibits no edema or tenderness.   Lymphadenopathy:     She has no cervical adenopathy.   Neurological: She is alert and oriented to person, place, and time. She has normal reflexes. No cranial nerve deficit. She exhibits normal muscle tone. Coordination normal.   Skin: Skin is warm and dry. No rash noted. No erythema.   Psychiatric:    Clearly the patient is back to her baseline interactive smiling talking to me although often in on intelligible words or concepts that are not strung together.         Labs:  All labs reviewed in the nursing home record.    Assessment:  1. Pneumonia     2. Lethargy     3. Dementia with behavioral problem     4. Osteoarthritis, generalized     5. Poor dentition         Plan: We will finish out the Augmentin and down the line try to rearrange discussion with family about status of care.    Total 45 minutes of which 55% was spent in counseling and coordination of care of the above plan.    Electronically signed by: Deangelo Argueta MD

## 2021-06-13 NOTE — PROGRESS NOTES
Code Status:  FULL CODE  Visit Type: Problem Visit (Family follow-up question seizures and medicines)     Facility:  [facility] [[id number]]        Facility Type:  (Long Term Care, LTC)    History of Present Illness: Halima Limon is a 82 y.o. female who I am seeing in conjunction with family to discuss medicines.  She has advanced dementia but has been quite stable.  We do have as needed both Ativan and Haldol written for but these have not been used in quite some time.  She is on dual  seizure therapy with both Keppra and Dilantin    Review of Systems     Physical Exam   Constitutional:   Patient is at her baseline easily confused but smile interacts tells me she come see her more.  His complete sentences.  Lungs have decreased air movement.  No significant inflammations in her hands.  Abdomen soft.  No seizures reported   Vitals reviewed.      Labs:  All labs reviewed in the nursing home record.    Assessment:  1. Dementia     2. History of CVA (cerebrovascular accident)     3. Seizure disorder     4. Osteoarthritis, generalized         Plan: Reviewed with the family that the Seroquel has worked well at night when not using the as needed Haldol or Ativan very infrequently but when she will not allow us doing her behavioral outbursts to do anything to evaluate her particularly in lieu of her full CODE STATUS we needed to use this and they were understanding of that.  In regards to the seizure focus she has not had one in we are to do as follows we will reduce her Dilantin from 100 in the a.m. and 200 at night to just 200 at night for 3 days then 100 at night for 3 days then DC.  Post 2 weeks we will do an update if this is good we will consider even reducing the Keppra as well simply because for her height and weight and the lack of seizures she may be safe without it and this may give her more mental clarity.  Family was in agreement.      35 minutes spent of which greater than 65% was face to face  communication with the patient about above plan of care    Electronically signed by: Deangelo Argueta MD

## 2021-06-13 NOTE — PROGRESS NOTES
Code Status:  FULL CODE  Visit Type: Review Of Multiple Medical Conditions     Facility:  [facility] [[id number]]        Facility Type:  (Long Term Care, LTC)    History of Present Illness: Halima Limon is a 82 y.o. female with a past medical history of dementia, history f CVA with subsequent seizure disorder, hypertension, hyperlipidemia who is seen today for follow up of multiple chronic medical conditions.    Staff report that Halima is doing well.  Patient reports that she has some good days and some bad days, but today has been a good day for her. She is overall feeling well and has no concerns.      Behaviors have been fine.  She tends to have escalating behaviors when she is ill.  Her family is still wanting to be aggressive with her cares.    Review of Systems   7 point ROS is negative, other than noted above    Physical Exam  /74  Pulse 92  Temp 98.7  F (37.1  C)  Resp 18  SpO2 96%  General:  Alert, cooperative, in no acute distress  HEENT:  Atraumatic.  EOMs intact.  No icterus.  Mucous membranes moist.   Neck:  Supple.  Normal ROM.  No lymphadenopathy.  CV: Regular rhythm and rate.  No murmurs, rubs, or gallops.  Lungs:  Clear to auscultation bilaterally.  No wheezes, rhonchi, or crackles.  Abd:  Soft, non-distended, non-tender.  Normoactive bowel sounds.  No organomegaly or masses appreciated.  Ext:  Distal pulses 2+ x4.  No lower extremity edema.  Skin:  Warm and dry.  No jaundice.  No rashes.      Labs:  All labs reviewed in the nursing home record.    Assessment:  1. Dementia     2. History of CVA (cerebrovascular accident)     3. Seizure disorder     4. Hypertension     5. Hyperlipidemia         Plan: Patient is doing well today with the above mentioned chronic conditions.  Behaviors have been better.  Tolerating medications without issue.  Will continue current therapies with no change.        25 minutes spent of which greater than 55% was face to face communication with the  patient about above plan of care  Deangelo Argueta MD      I have discussed this patient with [facility] attending physician, Dr. Deangelo Argueta, who agrees with the above assessment and plan.    Roslyn Mckeon MD ([place]) PGY-3  [facility]  10/18/2017  Pager: [phone]

## 2021-06-15 NOTE — PROGRESS NOTES
[facility]    Facility:   [facility] [[id number]]   Code Status: FULL CODE      CHIEF COMPLAINT/REASON FOR VISIT:  Chief Complaint   Patient presents with     Review Of Multiple Medical Conditions     Dementia, s/p CVA, Seizure Disorder s/p CVA       HISTORY:      HPI: Halima is a 82 y.o. female presenting for a compliance visit at her LTC facility: [facility]. She has a medical history that is significant for dementia, s/p CVA, seizure disorder s/p CVA, HTN, and hyperlipidemia. She has been residing at [facility] for quite some time now per her report. She is alert, pleasant, and conversant--however she is confused, neurologic deficits from dementia are quite evident. She states she is happy and doing well here. She has a great deal of friends and the staff adore her. She does not have any medical conditions that she would like to discuss today. Nursing staff also share that they do not have any concerns for her. She has not had any seizures or seizure like activity. Per their report she has been doing quite well. She denies any other concerns including headaches, vision changes, chest pain/pressure, difficulty breathing, SOB, abdominal pain, nausea, vomiting, diarrhea, dysuria, increasing weakness.     Past Medical History:   Diagnosis Date     Acute respiratory failure      Ascending cholangitis      AV block, 3rd degree      Choledocholithiasis     S/P ERCP     Dementia      Hemorrhagic stroke 2010    WITH SUBSEQUENT SEIZURE     HTN (hypertension)      Hyperlipidemia      Hypertensive retinopathy      Seizure disorder      Sickle cell trait      Small bowel obstruction      Tobacco use              Family History   Problem Relation Age of Onset     Cataracts Mother      Social History     Social History     Marital status:      Spouse name: N/A     Number of children: N/A     Years of education: N/A     Social History Main Topics     Smoking status: Never  Smoker     Smokeless tobacco: Current User      Comment: snuff, chew     Alcohol use No     Drug use: Not on file     Sexual activity: Not on file     Other Topics Concern     Not on file     Social History Narrative       REVIEW OF SYSTEM:  Pertinent items are noted in HPI.     PHYSICAL EXAM:   Pulse 74  Resp 16  Wt 127 lb 3.2 oz (57.7 kg)  BMI 23.88 kg/m2  General appearance: alert, appears stated age and cooperative  Head: Normocephalic, without obvious abnormality, atraumatic  Lungs: clear to auscultation bilaterally  Heart: regular rate and rhythm, S1, S2 normal, no murmur, click, rub or gallop  Abdomen: soft, non-tender; bowel sounds normal; no masses,  no organomegaly  Extremities: extremities normal, atraumatic, no cyanosis or edema  Pulses: 2+ and symmetric  Skin: Skin color, texture, turgor normal. No rashes or lesions  Neurologic: Grossly normal      LABS:   Keppra level and CMP ordered today.    ASSESSMENT:      ICD-10-CM    1. Dementia F03.90    2. CVA (cerebrovascular accident due to intracerebral hemorrhage) I61.9    3. Seizure disorder as sequela of cerebrovascular accident I69.398     G40.909        PLAN:  Continue current care plan for all chronic medical conditions, as they are stable. Encouraged patient to engage in healthy lifestyle behaviors such as engaging in social activities, exercising, eating well, and following their care plan. Follow up for compliance visit for routine check-up, or sooner if needed. Will continue to monitor patient and work with NH staff collaboratively to work toward positive patient outcomes.    Seizure Disorder  -Will speak with Dr. Argueta, patient, and staff to determine if reducing or discontinuing Keppra is feasible.  -Keppra level ordered.    -Follow up as needed, per Keppra results.     History of Metabolic Deficiencies  -Ordered CMP.  -Follow up as needed, per CMP results.    Greater than 25 minutes spent with patient with at least 55% of this time spent on  counseling, education, and discussion of the above care plan with nursing staff, and patient.     Electronically signed by: Corin Ingram CNP

## 2021-06-16 PROBLEM — R04.0 EPISTAXIS: Status: ACTIVE | Noted: 2018-04-11

## 2021-06-16 PROBLEM — M19.90 OSTEOARTHRITIS: Status: ACTIVE | Noted: 2018-04-01

## 2021-06-17 NOTE — PROGRESS NOTES
[facility]    Facility:   [facility] [[id number]]   Code Status: FULL CODE      CHIEF COMPLAINT/REASON FOR VISIT:  Chief Complaint   Patient presents with     Review Of Multiple Medical Conditions     HTN, CVA, Seizure Disorder, Dementia       HISTORY:      HPI: Halima is a 82 y.o. female presenting for a compliance visit at her LTC facility: [facility]. She has a medical history that is significant for dementia, s/p CVA, seizure disorder s/p CVA, HTN, and hyperlipidemia. She has been residing at [facility] for quite some time now per her report. She is alert, pleasant, and conversant--however she is confused, neurologic deficits from dementia are quite evident. Halima has been doing well. She did in the past month have a few episodes of epistaxis but those have seemed to resolve. She is happy here at the nursing home and does not have any complaints. She is jovial and offers staff hugs and kisses, stating she loves everyone here. Nursing staff also share that they do not have any concerns for her. She has not had any seizures or seizure like activity. Per their report she has been doing quite well. She denies any other concerns including headaches, vision changes, chest pain/pressure, difficulty breathing, SOB, abdominal pain, nausea, vomiting, diarrhea, dysuria, increasing weakness. However, it is important to note that reliability of ROS is limited due to patient's cognitive state.    Past Medical History:   Diagnosis Date     Acute respiratory failure      Ascending cholangitis      AV block, 3rd degree      Choledocholithiasis     S/P ERCP     Dementia      Hemorrhagic stroke 2010    WITH SUBSEQUENT SEIZURE     HTN (hypertension)      Hyperlipidemia      Hypertensive retinopathy      Seizure disorder      Sickle cell trait      Small bowel obstruction      Tobacco use              Family History   Problem Relation Age of Onset     Cataracts Mother      Social  History     Social History     Marital status:      Spouse name: N/A     Number of children: N/A     Years of education: N/A     Social History Main Topics     Smoking status: Never Smoker     Smokeless tobacco: Current User      Comment: snuff, chew     Alcohol use No     Drug use: Not on file     Sexual activity: Not on file     Other Topics Concern     Not on file     Social History Narrative       REVIEW OF SYSTEM:  Pertinent items are noted in HPI.     PHYSICAL EXAM:   /80  Pulse 70  Temp 97  F (36.1  C)  Resp 20  Wt 136 lb (61.7 kg)  SpO2 98%  BMI 25.53 kg/m2  General appearance: alert, appears stated age and cooperative  Head: Normocephalic, without obvious abnormality, atraumatic  Lungs: clear to auscultation bilaterally  Heart: regular rate and rhythm, S1, S2 normal, no murmur, click, rub or gallop  Abdomen: soft, non-tender; bowel sounds normal; no masses,  no organomegaly  Extremities: extremities normal, atraumatic, no cyanosis or edema  Pulses: 2+ and symmetric  Skin: Skin color, texture, turgor normal. No rashes or lesions  Neurologic: Grossly normal      LABS:   Keppra level ordered today.    ASSESSMENT:      ICD-10-CM    1. Essential hypertension I10    2. Seizure disorder as sequela of cerebrovascular accident I69.398     G40.909    3. Dementia F03.90    4. CVA (cerebrovascular accident due to intracerebral hemorrhage) I61.9        PLAN:  Continue current care plan for all chronic medical conditions, as they are stable. Encouraged patient to engage in healthy lifestyle behaviors such as engaging in social activities, exercising, eating well, and following their care plan. Follow up for compliance visit for routine check-up, or sooner if needed. Will continue to monitor patient and work with NH staff collaboratively to work toward positive patient outcomes.    Seizure Disorder  -Keppra level ordered.    -Follow up as needed, per Keppra results.     HTN  -Well controlled. Continue  current care plan.     Dementia  -Stable, patient is in a safe controlled environment.   -Continue current programming.     Greater than 25 minutes spent with patient with at least 55% of this time spent on counseling, education, and discussion of the above care plan with nursing staff, and patient.     Electronically signed by: Corin Ingram CNP

## 2021-06-17 NOTE — PROGRESS NOTES
[facility]    Facility:   [facility] [[id number]]   Code Status: FULL CODE      CHIEF COMPLAINT/REASON FOR VISIT:  Chief Complaint   Patient presents with     Problem Visit     pain of osteoarthritis       HISTORY:      HPI: Halima is a 82 y.o. female presenting for a compliance visit at her LTC facility: [facility]. She has a medical history that is significant for dementia, s/p CVA, seizure disorder s/p CVA, HTN, and hyperlipidemia. She has been residing at [facility] for quite some time now per her report. She is alert, pleasant, and conversant--however she is confused, neurologic deficits from dementia are quite evident. Today she is being evaluated for medication management for osteoarthritis. Per nursing home staff and records she does have osteoarthritis and takes Tramadol for it, which appears to work very well. She does complain of aches and pains when she does not have it. When discussing it with her today she is unable to give a good history but states she does have achy joints. She denies any other concerns including headaches, vision changes, chest pain/pressure, difficulty breathing, SOB, abdominal pain, nausea, vomiting, diarrhea, dysuria, increasing weakness.     Past Medical History:   Diagnosis Date     Acute respiratory failure      Ascending cholangitis      AV block, 3rd degree      Choledocholithiasis     S/P ERCP     Dementia      Hemorrhagic stroke 2010    WITH SUBSEQUENT SEIZURE     HTN (hypertension)      Hyperlipidemia      Hypertensive retinopathy      Seizure disorder      Sickle cell trait      Small bowel obstruction      Tobacco use              Family History   Problem Relation Age of Onset     Cataracts Mother      Social History     Social History     Marital status:      Spouse name: N/A     Number of children: N/A     Years of education: N/A     Social History Main Topics     Smoking status: Never Smoker     Smokeless tobacco:  Current User      Comment: snuff, chew     Alcohol use No     Drug use: Not on file     Sexual activity: Not on file     Other Topics Concern     Not on file     Social History Narrative       REVIEW OF SYSTEM:  Pertinent items are noted in HPI.     PHYSICAL EXAM:   /76  Pulse 74  Temp 97  F (36.1  C)  Resp 20  Wt 137 lb 6.4 oz (62.3 kg)  SpO2 97%  BMI 25.79 kg/m2  General appearance: alert, appears stated age and cooperative  Head: Normocephalic, without obvious abnormality, atraumatic  Lungs: respirations without effort  Extremities: extremities normal, atraumatic, no cyanosis or edema  Pulses: 2+ and symmetric  Skin: Skin color, texture, turgor normal. No rashes or lesions  Neurologic: Grossly normal      LABS:   Keppra level and CMP ordered today.    ASSESSMENT:      ICD-10-CM    1. Osteoarthritis M19.90        PLAN:  Osteoarthritis  -Continue Tramadol 25 mg by mouth three times daily for pain.   -Discuss reduction of Tramadol with family and then make a decision on alternate pain control methods.   -Follow up in 2 weeks.     Continue current care plan for all chronic medical conditions, as they are stable. Encouraged patient to engage in healthy lifestyle behaviors such as engaging in social activities, exercising, eating well, and following their care plan. Follow up for compliance visit for routine check-up, or sooner if needed. Will continue to monitor patient and work with NH staff collaboratively to work toward positive patient outcomes.    Greater than 25 minutes spent with patient with at least 55% of this time spent on counseling, education, and discussion of the above care plan with nursing staff, and patient.     Electronically signed by: Corin Ingram CNP

## 2021-06-17 NOTE — PROGRESS NOTES
[facility]    Facility:   [facility]  [[id number]]   Code Status: FULL CODE      CHIEF COMPLAINT/REASON FOR VISIT:  Chief Complaint   Patient presents with     Review Of Multiple Medical Conditions       HISTORY:      HPI: Halima is a 82 y.o. female who I am seeing for review of chronic medical conditions.  Recently she had a significant nosebleed at night and staff it asked if we could transfer her to the hospital because they were unable to get a control.  She has had intermittent rhinorrhea for some time refractory to antihistamine therapies.  She did well and recovered from this and is now returned to her baseline.  Staff has no new concerns about her.  She has been well maintained on the prednisone which is really made an impact on her arthritis in her mobility and a positive manner, retire pain which is helped her both with sleep and some of her behaviors.  And recently tramadol appears to have improved her mobility as well.  Her weight was only 120 pounds in September 2017 and she is now 14 pounds greater.    Past Medical History:   Diagnosis Date     Acute respiratory failure      Ascending cholangitis      AV block, 3rd degree      Choledocholithiasis     S/P ERCP     Dementia      Hemorrhagic stroke 2010    WITH SUBSEQUENT SEIZURE     HTN (hypertension)      Hyperlipidemia      Hypertensive retinopathy      Seizure disorder      Sickle cell trait      Small bowel obstruction      Tobacco use              Family History   Problem Relation Age of Onset     Cataracts Mother      Social History     Social History     Marital status:      Spouse name: N/A     Number of children: N/A     Years of education: N/A     Social History Main Topics     Smoking status: Never Smoker     Smokeless tobacco: Current User      Comment: snuff, chew     Alcohol use No     Drug use: Not on file     Sexual activity: Not on file     Other Topics Concern     Not on file     Social History  Narrative         Review of Systems    .  Vitals:    04/12/18 1518   BP: 118/67   Pulse: 90   Weight: 134 lb (60.8 kg)       Physical Exam patient is alert and actually completing more sentences than usual.  She is observed carrying several things in her right hand and her four-point cane dragging behind her in her left hand.  She is very talkative and at times not fully understandable.  Careful examination of the nares does show some erythema in the medial and lateral walls bilaterally left greater than right.  Lungs show loose rhonchi scattered.      LABS:       ASSESSMENT:      ICD-10-CM    1. Epistaxis R04.0    2. Osteoarthritis M19.90    3. Dementia F03.90    4. CVA (cerebrovascular accident due to intracerebral hemorrhage) I61.9    5. Seizure disorder as sequela of cerebrovascular accident I69.398     G40.909        PLAN:    At this juncture she always refuses lab work and so we are not going to do monitoring on her at this time.  However I do feel that the nasal congestion is significant and we are going to go with Flonase 1 q. nares daily in hopes that this will reduce the inflammation and give greater support to the mucosal tissues.    Total 25 minutes of which 65% was spent counseling and coordination of care of the above plan.    Electronically signed by: Deangelo Argueta MD

## 2021-06-17 NOTE — PROGRESS NOTES
"[facility]    Facility:   [facility] [[id number]]   Code Status: FULL CODE      CHIEF COMPLAINT/REASON FOR VISIT:  Chief Complaint   Patient presents with     Problem Visit     Epistaxis       HISTORY:      HPI: Halima is a 82 y.o. female presenting for a compliance visit at her LTC facility: [facility]. She has a medical history that is significant for dementia, s/p CVA, seizure disorder s/p CVA, HTN, and hyperlipidemia. She has been residing at [facility] for quite some time now per her report. She is alert, pleasant, and conversant--however she is confused, neurologic deficits from dementia are quite evident. Today she is being evaluated for epistaxis. Apparently last night at 0300 she had quite the bloody nose. At that time nursing staff panicked and called 911. EMS arrived and refused to take her to the ER. She stopped bleeding shortly thereafter and no other episodes since. She does not recall the episode and states she has been fine. She has no worries and is \"happy as can be\". She denies any other concerns including headaches, vision changes, chest pain/pressure, difficulty breathing, SOB, abdominal pain, nausea, vomiting, diarrhea, dysuria, increasing weakness.     Past Medical History:   Diagnosis Date     Acute respiratory failure      Ascending cholangitis      AV block, 3rd degree      Choledocholithiasis     S/P ERCP     Dementia      Hemorrhagic stroke 2010    WITH SUBSEQUENT SEIZURE     HTN (hypertension)      Hyperlipidemia      Hypertensive retinopathy      Seizure disorder      Sickle cell trait      Small bowel obstruction      Tobacco use              Family History   Problem Relation Age of Onset     Cataracts Mother      Social History     Social History     Marital status:      Spouse name: N/A     Number of children: N/A     Years of education: N/A     Social History Main Topics     Smoking status: Never Smoker     Smokeless tobacco: " Current User      Comment: snuff, chew     Alcohol use No     Drug use: Not on file     Sexual activity: Not on file     Other Topics Concern     Not on file     Social History Narrative       REVIEW OF SYSTEM:  Pertinent items are noted in HPI.     PHYSICAL EXAM:   /76  Pulse 74  Temp 97  F (36.1  C)  Resp 20  Wt 137 lb 6.4 oz (62.3 kg)  SpO2 97%  BMI 25.79 kg/m2  General appearance: alert, appears stated age and cooperative  Head: Normocephalic, without obvious abnormality, atraumatic  Lungs: respirations without effort  Extremities: extremities normal, atraumatic, no cyanosis or edema  Pulses: 2+ and symmetric  Skin: Skin color, texture, turgor normal. No rashes or lesions  Neurologic: Grossly normal      LABS:   None today.    ASSESSMENT:      ICD-10-CM    1. Epistaxis R04.0        PLAN:  Epistaxis-Resolved  -Vaseline to nares daily as needed.   -Monitor for bleeding and report to provider team.  -Follow up as needed.     Continue current care plan for all chronic medical conditions, as they are stable. Encouraged patient to engage in healthy lifestyle behaviors such as engaging in social activities, exercising, eating well, and following their care plan. Follow up for compliance visit for routine check-up, or sooner if needed. Will continue to monitor patient and work with NH staff collaboratively to work toward positive patient outcomes.    Greater than 25 minutes spent with patient with at least 55% of this time spent on counseling, education, and discussion of the above care plan with nursing staff, and patient.     Electronically signed by: Corin Ingram CNP